# Patient Record
Sex: MALE | Race: WHITE | NOT HISPANIC OR LATINO | Employment: FULL TIME | URBAN - METROPOLITAN AREA
[De-identification: names, ages, dates, MRNs, and addresses within clinical notes are randomized per-mention and may not be internally consistent; named-entity substitution may affect disease eponyms.]

---

## 2022-02-28 ENCOUNTER — HOSPITAL ENCOUNTER (OUTPATIENT)
Facility: CLINIC | Age: 32
Setting detail: OBSERVATION
Discharge: HOME | End: 2022-03-01
Attending: EMERGENCY MEDICINE | Admitting: HOSPITALIST
Payer: COMMERCIAL

## 2022-02-28 ENCOUNTER — APPOINTMENT (OUTPATIENT)
Dept: CT IMAGING | Facility: CLINIC | Age: 32
End: 2022-02-28
Attending: EMERGENCY MEDICINE
Payer: COMMERCIAL

## 2022-02-28 DIAGNOSIS — K51.919 ULCERATIVE COLITIS WITH COMPLICATION, UNSPECIFIED LOCATION (H): ICD-10-CM

## 2022-02-28 DIAGNOSIS — R50.9 ACUTE FEBRILE ILLNESS: ICD-10-CM

## 2022-02-28 LAB
ALBUMIN SERPL-MCNC: 3.9 G/DL (ref 3.4–5)
ALBUMIN UR-MCNC: NEGATIVE MG/DL
ALP SERPL-CCNC: 52 U/L (ref 40–150)
ALT SERPL W P-5'-P-CCNC: 20 U/L (ref 0–70)
ANION GAP SERPL CALCULATED.3IONS-SCNC: 4 MMOL/L (ref 3–14)
APPEARANCE UR: CLEAR
AST SERPL W P-5'-P-CCNC: 15 U/L (ref 0–45)
BASOPHILS # BLD AUTO: 0 10E3/UL (ref 0–0.2)
BASOPHILS NFR BLD AUTO: 0 %
BILIRUB SERPL-MCNC: 1.2 MG/DL (ref 0.2–1.3)
BILIRUB UR QL STRIP: NEGATIVE
BUN SERPL-MCNC: 11 MG/DL (ref 7–30)
CALCIUM SERPL-MCNC: 9.2 MG/DL (ref 8.5–10.1)
CHLORIDE BLD-SCNC: 107 MMOL/L (ref 94–109)
CO2 SERPL-SCNC: 27 MMOL/L (ref 20–32)
COLOR UR AUTO: ABNORMAL
CREAT SERPL-MCNC: 0.86 MG/DL (ref 0.66–1.25)
EOSINOPHIL # BLD AUTO: 0 10E3/UL (ref 0–0.7)
EOSINOPHIL NFR BLD AUTO: 0 %
ERYTHROCYTE [DISTWIDTH] IN BLOOD BY AUTOMATED COUNT: 12 % (ref 10–15)
GFR SERPL CREATININE-BSD FRML MDRD: >90 ML/MIN/1.73M2
GLUCOSE BLD-MCNC: 119 MG/DL (ref 70–99)
GLUCOSE UR STRIP-MCNC: NEGATIVE MG/DL
HCT VFR BLD AUTO: 48 % (ref 40–53)
HGB BLD-MCNC: 16 G/DL (ref 13.3–17.7)
HGB UR QL STRIP: NEGATIVE
IMM GRANULOCYTES # BLD: 0 10E3/UL
IMM GRANULOCYTES NFR BLD: 0 %
KETONES UR STRIP-MCNC: NEGATIVE MG/DL
LACTATE SERPL-SCNC: 1.1 MMOL/L (ref 0.7–2)
LEUKOCYTE ESTERASE UR QL STRIP: NEGATIVE
LYMPHOCYTES # BLD AUTO: 0.8 10E3/UL (ref 0.8–5.3)
LYMPHOCYTES NFR BLD AUTO: 7 %
MCH RBC QN AUTO: 31.4 PG (ref 26.5–33)
MCHC RBC AUTO-ENTMCNC: 33.3 G/DL (ref 31.5–36.5)
MCV RBC AUTO: 94 FL (ref 78–100)
MONOCYTES # BLD AUTO: 0.8 10E3/UL (ref 0–1.3)
MONOCYTES NFR BLD AUTO: 7 %
NEUTROPHILS # BLD AUTO: 10.4 10E3/UL (ref 1.6–8.3)
NEUTROPHILS NFR BLD AUTO: 86 %
NITRATE UR QL: NEGATIVE
NRBC # BLD AUTO: 0 10E3/UL
NRBC BLD AUTO-RTO: 0 /100
PH UR STRIP: 8.5 [PH] (ref 5–7)
PLATELET # BLD AUTO: 205 10E3/UL (ref 150–450)
POTASSIUM BLD-SCNC: 3.7 MMOL/L (ref 3.4–5.3)
PROT SERPL-MCNC: 6.8 G/DL (ref 6.8–8.8)
RBC # BLD AUTO: 5.1 10E6/UL (ref 4.4–5.9)
RBC URINE: <1 /HPF
SARS-COV-2 RNA RESP QL NAA+PROBE: NEGATIVE
SODIUM SERPL-SCNC: 138 MMOL/L (ref 133–144)
SP GR UR STRIP: 1.02 (ref 1–1.03)
UROBILINOGEN UR STRIP-MCNC: NORMAL MG/DL
WBC # BLD AUTO: 12.1 10E3/UL (ref 4–11)
WBC URINE: 0 /HPF

## 2022-02-28 PROCEDURE — 99220 PR INITIAL OBSERVATION CARE,LEVEL III: CPT | Performed by: PHYSICIAN ASSISTANT

## 2022-02-28 PROCEDURE — 96361 HYDRATE IV INFUSION ADD-ON: CPT

## 2022-02-28 PROCEDURE — 258N000003 HC RX IP 258 OP 636: Performed by: EMERGENCY MEDICINE

## 2022-02-28 PROCEDURE — 83605 ASSAY OF LACTIC ACID: CPT | Performed by: EMERGENCY MEDICINE

## 2022-02-28 PROCEDURE — 250N000011 HC RX IP 250 OP 636: Performed by: EMERGENCY MEDICINE

## 2022-02-28 PROCEDURE — 80053 COMPREHEN METABOLIC PANEL: CPT | Performed by: EMERGENCY MEDICINE

## 2022-02-28 PROCEDURE — C9803 HOPD COVID-19 SPEC COLLECT: HCPCS

## 2022-02-28 PROCEDURE — 81001 URINALYSIS AUTO W/SCOPE: CPT | Performed by: EMERGENCY MEDICINE

## 2022-02-28 PROCEDURE — G0378 HOSPITAL OBSERVATION PER HR: HCPCS

## 2022-02-28 PROCEDURE — 250N000009 HC RX 250: Performed by: EMERGENCY MEDICINE

## 2022-02-28 PROCEDURE — 258N000003 HC RX IP 258 OP 636: Performed by: PHYSICIAN ASSISTANT

## 2022-02-28 PROCEDURE — 74177 CT ABD & PELVIS W/CONTRAST: CPT

## 2022-02-28 PROCEDURE — 36415 COLL VENOUS BLD VENIPUNCTURE: CPT | Performed by: EMERGENCY MEDICINE

## 2022-02-28 PROCEDURE — 99285 EMERGENCY DEPT VISIT HI MDM: CPT | Mod: 25

## 2022-02-28 PROCEDURE — 250N000013 HC RX MED GY IP 250 OP 250 PS 637: Performed by: PHYSICIAN ASSISTANT

## 2022-02-28 PROCEDURE — 87635 SARS-COV-2 COVID-19 AMP PRB: CPT | Performed by: EMERGENCY MEDICINE

## 2022-02-28 PROCEDURE — 96374 THER/PROPH/DIAG INJ IV PUSH: CPT | Mod: 59

## 2022-02-28 PROCEDURE — 87040 BLOOD CULTURE FOR BACTERIA: CPT | Performed by: EMERGENCY MEDICINE

## 2022-02-28 PROCEDURE — 85025 COMPLETE CBC W/AUTO DIFF WBC: CPT | Performed by: EMERGENCY MEDICINE

## 2022-02-28 RX ORDER — PREDNISONE 10 MG/1
10 TABLET ORAL DAILY
Status: DISCONTINUED | OUTPATIENT
Start: 2022-03-01 | End: 2022-03-01 | Stop reason: HOSPADM

## 2022-02-28 RX ORDER — LIDOCAINE 40 MG/G
CREAM TOPICAL
Status: DISCONTINUED | OUTPATIENT
Start: 2022-02-28 | End: 2022-03-01 | Stop reason: HOSPADM

## 2022-02-28 RX ORDER — SODIUM CHLORIDE, SODIUM LACTATE, POTASSIUM CHLORIDE, CALCIUM CHLORIDE 600; 310; 30; 20 MG/100ML; MG/100ML; MG/100ML; MG/100ML
INJECTION, SOLUTION INTRAVENOUS CONTINUOUS
Status: DISCONTINUED | OUTPATIENT
Start: 2022-02-28 | End: 2022-03-01 | Stop reason: HOSPADM

## 2022-02-28 RX ORDER — MESALAMINE 800 MG/1
3 TABLET, DELAYED RELEASE ORAL 2 TIMES DAILY
COMMUNITY
Start: 2022-01-25

## 2022-02-28 RX ORDER — PROCHLORPERAZINE 25 MG
25 SUPPOSITORY, RECTAL RECTAL EVERY 12 HOURS PRN
Status: DISCONTINUED | OUTPATIENT
Start: 2022-02-28 | End: 2022-03-01 | Stop reason: HOSPADM

## 2022-02-28 RX ORDER — MIDODRINE HYDROCHLORIDE 5 MG/1
5 TABLET ORAL DAILY
COMMUNITY
Start: 2020-09-03

## 2022-02-28 RX ORDER — IOPAMIDOL 755 MG/ML
500 INJECTION, SOLUTION INTRAVASCULAR ONCE
Status: COMPLETED | OUTPATIENT
Start: 2022-02-28 | End: 2022-02-28

## 2022-02-28 RX ORDER — ONDANSETRON 4 MG/1
4 TABLET, ORALLY DISINTEGRATING ORAL EVERY 6 HOURS PRN
Status: DISCONTINUED | OUTPATIENT
Start: 2022-02-28 | End: 2022-03-01 | Stop reason: HOSPADM

## 2022-02-28 RX ORDER — FERROUS SULFATE 325(65) MG
325 TABLET ORAL
COMMUNITY

## 2022-02-28 RX ORDER — PREDNISONE 10 MG/1
TABLET ORAL
COMMUNITY
Start: 2022-01-25

## 2022-02-28 RX ORDER — SERTRALINE HYDROCHLORIDE 100 MG/1
100 TABLET, FILM COATED ORAL DAILY
COMMUNITY
Start: 2020-09-03

## 2022-02-28 RX ORDER — ONDANSETRON 2 MG/ML
4 INJECTION INTRAMUSCULAR; INTRAVENOUS EVERY 6 HOURS PRN
Status: DISCONTINUED | OUTPATIENT
Start: 2022-02-28 | End: 2022-03-01 | Stop reason: HOSPADM

## 2022-02-28 RX ORDER — MESALAMINE 800 MG/1
2400 TABLET, DELAYED RELEASE ORAL 2 TIMES DAILY
Status: DISCONTINUED | OUTPATIENT
Start: 2022-02-28 | End: 2022-03-01 | Stop reason: HOSPADM

## 2022-02-28 RX ORDER — ACETAMINOPHEN 325 MG/1
650 TABLET ORAL EVERY 6 HOURS PRN
Status: DISCONTINUED | OUTPATIENT
Start: 2022-02-28 | End: 2022-03-01 | Stop reason: HOSPADM

## 2022-02-28 RX ORDER — FERROUS SULFATE 325(65) MG
325 TABLET ORAL
Status: DISCONTINUED | OUTPATIENT
Start: 2022-03-01 | End: 2022-03-01 | Stop reason: HOSPADM

## 2022-02-28 RX ORDER — KETOROLAC TROMETHAMINE 30 MG/ML
30 INJECTION, SOLUTION INTRAMUSCULAR; INTRAVENOUS EVERY 6 HOURS PRN
Status: DISCONTINUED | OUTPATIENT
Start: 2022-02-28 | End: 2022-03-01 | Stop reason: HOSPADM

## 2022-02-28 RX ORDER — SERTRALINE HYDROCHLORIDE 100 MG/1
100 TABLET, FILM COATED ORAL DAILY
Status: DISCONTINUED | OUTPATIENT
Start: 2022-03-01 | End: 2022-03-01 | Stop reason: HOSPADM

## 2022-02-28 RX ORDER — VITAMIN B COMPLEX
1 TABLET ORAL DAILY
COMMUNITY

## 2022-02-28 RX ORDER — ACETAMINOPHEN 650 MG/1
650 SUPPOSITORY RECTAL EVERY 6 HOURS PRN
Status: DISCONTINUED | OUTPATIENT
Start: 2022-02-28 | End: 2022-03-01 | Stop reason: HOSPADM

## 2022-02-28 RX ORDER — CHLORAL HYDRATE 500 MG
1 CAPSULE ORAL DAILY
COMMUNITY

## 2022-02-28 RX ORDER — PROCHLORPERAZINE MALEATE 5 MG
10 TABLET ORAL EVERY 6 HOURS PRN
Status: DISCONTINUED | OUTPATIENT
Start: 2022-02-28 | End: 2022-03-01 | Stop reason: HOSPADM

## 2022-02-28 RX ORDER — MIDODRINE HYDROCHLORIDE 5 MG/1
5 TABLET ORAL DAILY
Status: DISCONTINUED | OUTPATIENT
Start: 2022-03-01 | End: 2022-03-01 | Stop reason: HOSPADM

## 2022-02-28 RX ORDER — KETOROLAC TROMETHAMINE 15 MG/ML
15 INJECTION, SOLUTION INTRAMUSCULAR; INTRAVENOUS ONCE
Status: COMPLETED | OUTPATIENT
Start: 2022-02-28 | End: 2022-02-28

## 2022-02-28 RX ADMIN — SODIUM CHLORIDE 65 ML: 9 INJECTION, SOLUTION INTRAVENOUS at 15:08

## 2022-02-28 RX ADMIN — MESALAMINE 2400 MG: 800 TABLET, DELAYED RELEASE ORAL at 20:08

## 2022-02-28 RX ADMIN — SODIUM CHLORIDE, POTASSIUM CHLORIDE, SODIUM LACTATE AND CALCIUM CHLORIDE: 600; 310; 30; 20 INJECTION, SOLUTION INTRAVENOUS at 20:01

## 2022-02-28 RX ADMIN — IOPAMIDOL 95 ML: 755 INJECTION, SOLUTION INTRAVENOUS at 15:08

## 2022-02-28 RX ADMIN — KETOROLAC TROMETHAMINE 15 MG: 15 INJECTION, SOLUTION INTRAMUSCULAR; INTRAVENOUS at 14:18

## 2022-02-28 RX ADMIN — SODIUM CHLORIDE, POTASSIUM CHLORIDE, SODIUM LACTATE AND CALCIUM CHLORIDE 1000 ML: 600; 310; 30; 20 INJECTION, SOLUTION INTRAVENOUS at 14:08

## 2022-02-28 ASSESSMENT — ENCOUNTER SYMPTOMS
FREQUENCY: 0
BLOOD IN STOOL: 1
ABDOMINAL PAIN: 1
DIARRHEA: 1
RECTAL PAIN: 1
HEMATURIA: 0
ABDOMINAL DISTENTION: 1
FEVER: 1
DYSURIA: 0
NAUSEA: 1
CHILLS: 1

## 2022-02-28 NOTE — ED PROVIDER NOTES
History   Chief Complaint:  Abdominal Pain and Fever     The history is provided by the patient.      Rick Tarango is a 31 year old male with history of ulcerative colitis who presents with abdominal pain and fever. Patient has been having a flare of his ulcerative colitis since July. He presents today due to a fever of 100.8F this morning. He notes some abdominal pain and distention throughout this flare. He noticed blood in his stool this morning as well. He is having two to four mucous-like bowel movements per day. He reports feeling nauseous and having the chills as well. He has some discomfort around his anus but he does not think the skin is broken down. He notes he had some right rectal pain yesterday after having a bowel movement. He is currently taking Remicade and is at the end of a prednisone taper. He started on 40 mg of prednisone and has been decreasing each week. He went from 15 mg to 10 mg six days ago and is planning to continue to taper down. His last Remicade infusion was two weeks ago. His last flare was winter of 2020. This current flare is the longest flare he has had. No urinary concerns. He was initially diagnosed with ulcerative colitis in 2017. He did have an infection in his colon when first diagnosed. He has had C.diff in the past as well. He follows with Dr. Hawley at [facility].      Review of Systems   Constitutional: Positive for chills and fever.   Gastrointestinal: Positive for abdominal distention, abdominal pain, blood in stool, diarrhea, nausea and rectal pain.   Genitourinary: Negative for dysuria, frequency and hematuria.   All other systems reviewed and are negative.     Allergies:  The patient has no known allergies.     Medications:  Remicade  Prednisone     Past Medical History:     Pityriasis lichenoides  SVT   Ulcerative colitis   Varicella  Anxiety state   Atrial fibrillation     Past Surgical History:    Cardiac ablation  Upper endoscopy   Colonoscopy     Family  History:    Daughter: heart disease     Social History:  Presents to ED alone    Physical Exam     Patient Vitals for the past 24 hrs:   BP Temp Temp src Pulse Resp SpO2 Weight   02/28/22 1331 124/69 100  F (37.8  C) Oral 113 18 100 % 85.7 kg (189 lb)       Physical Exam    HENT: mmm  Eyes: periorbital tissue and sclera normal  Neck: supple  CV: ppi, regular   Resp: speaking in full sentences without any resp distress  Abd: abdomen is soft without significant tenderness, masses, organomegaly or guarding, rectum: No fissure, hemorrhoid, fluctuance, significant tenderness palpation  Ext: peripheral edema present:  No  Skin: warm dry well perfused  Neuro: Alert, no gross motor or sensory deficits,  gait stable        Emergency Department Course     Imaging:  Abd/pelvis CT,  IV  contrast only TRAUMA / AAA   Final Result   IMPRESSION:    No acute abnormality in the abdomen or pelvis. No cause for abdominal   pain is identified.      WILLIAM GREGG MD            SYSTEM ID:  [id number]      Report per radiology    Laboratory:  Labs Ordered and Resulted from Time of ED Arrival to Time of ED Departure   COMPREHENSIVE METABOLIC PANEL - Abnormal       Result Value    Sodium 138      Potassium 3.7      Chloride 107      Carbon Dioxide (CO2) 27      Anion Gap 4      Urea Nitrogen 11      Creatinine 0.86      Calcium 9.2      Glucose 119 (*)     Alkaline Phosphatase 52      AST 15      ALT 20      Protein Total 6.8      Albumin 3.9      Bilirubin Total 1.2      GFR Estimate >90     ROUTINE UA WITH MICROSCOPIC REFLEX TO CULTURE - Abnormal    Color Urine Light Yellow      Appearance Urine Clear      Glucose Urine Negative      Bilirubin Urine Negative      Ketones Urine Negative      Specific Gravity Urine 1.019      Blood Urine Negative      pH Urine 8.5 (*)     Protein Albumin Urine Negative      Urobilinogen Urine Normal      Nitrite Urine Negative      Leukocyte Esterase Urine Negative      RBC Urine <1      WBC Urine 0     CBC  WITH PLATELETS AND DIFFERENTIAL - Abnormal    WBC Count 12.1 (*)     RBC Count 5.10      Hemoglobin 16.0      Hematocrit 48.0      MCV 94      MCH 31.4      MCHC 33.3      RDW 12.0      Platelet Count 205      % Neutrophils 86      % Lymphocytes 7      % Monocytes 7      % Eosinophils 0      % Basophils 0      % Immature Granulocytes 0      NRBCs per 100 WBC 0      Absolute Neutrophils 10.4 (*)     Absolute Lymphocytes 0.8      Absolute Monocytes 0.8      Absolute Eosinophils 0.0      Absolute Basophils 0.0      Absolute Immature Granulocytes 0.0      Absolute NRBCs 0.0     LACTIC ACID WHOLE BLOOD - Normal    Lactic Acid 1.1     ENTERIC BACTERIA AND VIRUS PANEL BY YOBANI STOOL   CLOSTRIDIUM DIFFICILE TOXIN B   BLOOD CULTURE   BLOOD CULTURE      Emergency Department Course:     Reviewed:  I reviewed nursing notes, vitals, past medical history and Care Everywhere    Assessments:  1347 I obtained history and examined the patient as noted above.    I rechecked the patient and explained findings.     Consults:  1453 I spoke with Dr. Ryan from GI regarding patient's presentation, findings, and plan of care.     1600 I spoke with GI again regarding the patient's presentation, findings, and plan of care.     Interventions:  1408 Lactated ringers 1L IV   1418 Toradol 15 mg IV     Disposition:  The patient was admitted to the hospital under the care of Dr. Bradshaw .     Impression & Plan         Medical Decision Makin-year-old on Remicade and prednisone (long slow taper) currently 10 mg presenting with lower abdominal pain, fever, small number of blood per rectum in the setting of known ulcerative colitis with stool frequency recently of 2-4 stools per day.  Temperature noted to be 100 here with associated mild tachycardia.  No significant leukocytosis or leukopenia.  Lactate normal.  Abdominal exam is reassuring.  No signs on exam of perirectal abscess or fistula.  CT without significant acute finding be it bowel edema or  secondary source of fever such as appendicitis.    Given Remicade slowly tapering prednisone and a mixed picture of bright red blood and mucus per rectum but no recent increase in stool frequency with decrease in steroid dose as well as history of C. difficile colitis and not wanting to give empiric antibiotics given overall stability and increased risk of C. difficile will admit to observation and follow the stool studies to see if they can give us a causative etiology.  Patient comfortable and agreeable with plan nose stool studies will help dictate antibiotics versus not as well as to increase steroids or not.    Diagnosis:    ICD-10-CM    1. Ulcerative colitis with complication, unspecified location (H)  K51.919    2. Acute febrile illness  R50.9        Discharge Medications:  New Prescriptions    No medications on file       Scribe Disclosure:  Juan PEREZ, am serving as a scribe at 1:45 PM on 2/28/2022 to document services personally performed by Ceferino Nina MD based on my observations and the provider's statements to me.            Ceferino Nina MD  02/28/22 4975

## 2022-02-28 NOTE — ED TRIAGE NOTES
A&O x4, ABCs intact. Pt presents with concern for abdominal pain and fever. Pt states that he has been having an ulcerative colitis flair and this morning he developed a fever of 100.8. pt taking remicade and prednisone.

## 2022-02-28 NOTE — H&P
History and Physical     Rick Tarango MRN# [id number]   YOB: 1990 Age: 31 year old      Date of Admission:  2/28/2022    Primary care provider: [facility]          Assessment and Plan:   Rick Tarango is a 31 year old male with a PMH significant for ulcerative colitis, previous C. difficile infection, anemia, anxiety and paroxysmal ventricular tachycardia who presents with bright red blood per rectum and fever in the setting of ulcerative colitis flare.     Patient was discussed with Dr. Nina, who was provider in ED. Chart review of ED work up was reviewed as well as chart review of Care Everywhere, previous visits and admissions.     #Bright red blood per rectum with fever  #History of ulcerative colitis  -Development of bright red blood per rectum on 2/28 with fever up to 100.8.  Otherwise feels well except for lack of appetite  -Abdominal pain at baseline and stools have been more formed  -Lab work remarkable for mild elevation of WBC at 12.1 and normal lactic acid  -CT abdomen unremarkable  -History of C. Difficile  -Stool sample for enteric panel and C. difficile need to be collected  -Blood cultures taken  -Dr. Ryan from [facility] evaluated patient in the emergency room requesting overnight observation  -No antibiotics recommended at this time  -LR at 100 mL/h  -Regular diet    #History of ulcerative colitis  -Follows with [facility]  -Current flare has been since July  -Receives regular Remicade and is on prednisone taper currently at 10 mg  -Dr. Ryan does not recommend any increase of steroids at this time  -GI consult  -Continue mesalamine and prednisone 10 mg    #History of paroxysmal ventricular tachycardia  -Continue midodrine    #Anxiety  -Continue sertraline            Social: No concerns  Code: Discussed with patient and they have chosen full code  VTE prophylaxis: PCDs  Disposition: Observation                    Chief Complaint:   Bright red blood per rectum  and fever         History of Present Illness:   Rick Tarango is a 31 year old male who presents with bright red blood per rectum and fever.  He states that he has been struggling since July with an ulcerative colitis flare and is currently receiving Remicade with a steroid taper.  He recently reduced his prednisone from 15 mg daily to 10 mg daily.  He noted bright red blood per rectum this morning along with fever up to 100.  He otherwise feels fine except for lack of appetite.  He did eat something this morning without any worsening of his abdominal pain.  He denies urinary symptoms, shortness of breath, cough and abdominal cramping.  His stools have been more formed than usual.  He has not traveled recently and has not had any sick contacts.  He does not drink alcohol regularly or smoke cigarettes.             Past Medical History:   Ulcerative colitis  Paroxysmal ventricular tachycardia  Anxiety  Anemia   C. difficile infection            Past Surgical History:   No past surgical history            Social History:     Social History     Socioeconomic History     Marital status: Single     Spouse name: Not on file     Number of children: Not on file     Years of education: Not on file     Highest education level: Not on file   Occupational History     Not on file   Tobacco Use     Smoking status: Not on file     Smokeless tobacco: Not on file   Substance and Sexual Activity     Alcohol use: Not on file     Drug use: Not on file     Sexual activity: Not on file   Other Topics Concern     Not on file   Social History Narrative     Not on file     Social Determinants of Health     Financial Resource Strain: Not on file   Food Insecurity: Not on file   Transportation Needs: Not on file   Physical Activity: Not on file   Stress: Not on file   Social Connections: Not on file   Intimate Partner Violence: Not on file   Housing Stability: Not on file               Family History:   Family history reviewed and is non  contributory         Allergies:    No Known Allergies            Medications:     Prior to Admission medications    Not on File              Review of Systems:   A Comprehensive greater than 10 system review of systems was carried out.  Pertinent positives and negatives are noted above.  Otherwise negative for contributory information.            Physical Exam:   Blood pressure 124/69, pulse 113, temperature 100  F (37.8  C), temperature source Oral, resp. rate 18, weight 85.7 kg (189 lb), SpO2 100 %.  Exam:  GENERAL:  Comfortable.  PSYCH: pleasant, oriented, No acute distress.  HEENT:  PERRLA. Normal conjunctiva, normal hearing, nasal mucosa and Oropharynx are normal.  NECK:  Supple, no neck vein distention, adenopathy or bruits, normal thyroid.  HEART:  Normal S1, S2 with no murmur, no pericardial rub, gallops or S3 or S4.  LUNGS:  Clear to auscultation, normal Respiratory effort. No wheezing, rales or ronchi.  ABDOMEN:  Soft, no hepatosplenomegaly, normal bowel sounds. Non-tender, non distended.   EXTREMITIES:  No pedal edema, +2 pulses bilateral and equal.  SKIN:  Dry to touch, No rash, wound or ulcerations.  NEUROLOGIC:  CN 2-12 grossly intact,  sensation is intact with no focal deficits.               Data:     Recent Labs   Lab 02/28/22  1408   WBC 12.1*   HGB 16.0   HCT 48.0   MCV 94        Recent Labs   Lab 02/28/22  1408      POTASSIUM 3.7   CHLORIDE 107   CO2 27   ANIONGAP 4   *   BUN 11   CR 0.86   GFRESTIMATED >90   DAVON 9.2   PROTTOTAL 6.8   ALBUMIN 3.9   BILITOTAL 1.2   ALKPHOS 52   AST 15   ALT 20     Recent Labs   Lab 02/28/22  1408   LACT 1.1         Recent Results (from the past 24 hour(s))   Abd/pelvis CT,  IV  contrast only TRAUMA / AAA    Narrative    CT ABDOMEN PELVIS WITH CONTRAST 2/28/2022 3:14 PM    CLINICAL HISTORY: Fever. Abdominal pain. History of ulcerative  colitis.    TECHNIQUE: CT scan of the abdomen and pelvis was performed following  injection of IV contrast.  Multiplanar reformats were obtained. Dose  reduction techniques were used.  CONTRAST: 95mL Isovue-370  COMPARISON: None.    FINDINGS:   LOWER CHEST: The visualized lung bases are clear.    HEPATOBILIARY: Unremarkable. No hepatic masses are seen.    PANCREAS: Normal.    SPLEEN: Normal.    ADRENAL GLANDS: Normal.    KIDNEYS/BLADDER: Unremarkable. No hydronephrosis.    BOWEL: No bowel obstruction. No convincing evidence for colitis or  diverticulitis. Unremarkable appendix.    PELVIC ORGANS: Unremarkable.    LYMPH NODES: No enlarged lymph nodes are identified in the abdomen or  pelvis.    VASCULATURE: Unremarkable.    ADDITIONAL FINDINGS: None.    MUSCULOSKELETAL: Unremarkable.      Impression    IMPRESSION:   No acute abnormality in the abdomen or pelvis. No cause for abdominal  pain is identified.    WILLIAM GREGG MD         SYSTEM ID:  [id number]         Lucia Alvarez PA-C

## 2022-02-28 NOTE — PHARMACY-ADMISSION MEDICATION HISTORY
Admission medication history interview status for this patient is complete. See [facility] admission navigator for allergy information, prior to admission medications and immunization status.     Medication history interview done, indicate source(s): Patient  Medication history resources (including written lists, pill bottles, clinic record):SureScripts, Care Everywhere  Pharmacy: [id number] [place], [place] - [address]    Changes made to PTA medication list:  Added: all meds  Changed: none  Reported as Not Taking: none  Removed: none    Actions taken by pharmacist (provider contacted, etc):paged provider     Additional medication history information:pt reported having first dose of Remicade on 1/31/22 and the second dose on 2/14/22. Mesalamine was prescribed 1600 mg BID but pt is taking 2400 mg BID.    Medication reconciliation/reorder completed by provider prior to medication history?  N    Prior to Admission medications    Medication Sig Last Dose Taking? Auth Provider   ferrous sulfate (FEROSUL) 325 (65 Fe) MG tablet Take 325 mg by mouth daily (with breakfast) 2/28/2022 at Unknown time Yes Unknown, Entered By History   fish oil-omega-3 fatty acids (OMEGA-3 FISH OIL) 1000 MG capsule Take 1 capsule by mouth daily 2/28/2022 at Unknown time Yes Unknown, Entered By History   mesalamine (ASACOL HD) 800 MG EC tablet Take 3 tablets by mouth 2 times daily 2/28/2022 at Unknown time Yes Unknown, Entered By History   midodrine (PROAMATINE) 5 MG tablet Take 5 mg by mouth daily 2/28/2022 at Unknown time Yes Unknown, Entered By History   Multiple Vitamin (MULTIVITAMIN ADULT PO) Take 1 tablet by mouth daily 2/28/2022 at Unknown time Yes Unknown, Entered By History   predniSONE (DELTASONE) 10 MG tablet TAKE 4 TABS ONCE DAILY X1 WK, 3 TABS DAILY X1 WK,2 TABS DAILY X1 WK,THEN DECREASE BY 1/2 TAB EACH WK 2/28/2022 at 10 mg today; last started 10 mg on Wednesday 2/23 Yes Unknown, Entered By History   sertraline  (ZOLOFT) 100 MG tablet Take 100 mg by mouth daily 2/28/2022 at AM Yes Unknown, Entered By History   Vitamin D3 (CHOLECALCIFEROL) 25 mcg (1000 units) tablet Take 1 tablet by mouth daily 2/28/2022 at Unknown time Yes Unknown, Entered By History

## 2022-02-28 NOTE — ED NOTES
[facility]  ED Nurse Handoff Report    Rick Tarango is a 31 year old male   ED Chief complaint: Abdominal Pain and Fever  . ED Diagnosis:   Final diagnoses:   Ulcerative colitis with complication, unspecified location (H)   Acute febrile illness     Allergies: No Known Allergies    Code Status: Full Code  Activity level - Baseline/Home:  Independent. Activity Level - Current:   Stand by Assist. Lift room needed: No. Bariatric: No   Needed: No   Isolation: No. Infection: Not Applicable  C-Diff Pending.     Vital Signs:   Vitals:    02/28/22 1331 02/28/22 1708 02/28/22 1712   BP: 124/69 123/72    Pulse: 113 84    Resp: 18     Temp: 100  F (37.8  C)     TempSrc: Oral     SpO2: 100% 100% 98%   Weight: 85.7 kg (189 lb)         Cardiac Rhythm:  ,      Pain level:    Patient confused: No. Patient Falls Risk: No.   Elimination Status: Has voided   Patient Report - Initial Complaint: abdominal pain, fever, ulcerative colitis flare. Focused Assessment: 31 year old male with history of ulcerative colitis who presents with abdominal pain and fever. Patient has been having a flare of his ulcerative colitis since July. He presents today due to a fever of 100.8F this morning. He notes some abdominal pain and distention throughout this flare. He noticed blood in his stool this morning as well. He is having two to four mucous-like bowel movements per day. He reports feeling nauseous and having the chills as well. He has some discomfort around his anus but he does not think the skin is broken down. He notes he had some right rectal pain yesterday after having a bowel movement. He is currently taking Remicade and is at the end of a prednisone taper. He started on 40 mg of prednisone and has been decreasing each week. He went from 15 mg to 10 mg six days ago and is planning to continue to taper down. His last Remicade infusion was two weeks ago. His last flare was winter of 2020. This current flare is  the longest flare he has had. No urinary concerns. He was initially diagnosed with ulcerative colitis in 2017. He did have an infection in his colon when first diagnosed. He has had C.diff in the past as well. He follows with Dr. Hawley at [facility].        Tests Performed: imaging, labs. Abnormal Results:   Abnormal Labs Resulted from Time of ED Arrival to Time of ED Departure   COMPREHENSIVE METABOLIC PANEL - Abnormal       Result Value    Sodium 138      Potassium 3.7      Chloride 107      Carbon Dioxide (CO2) 27      Anion Gap 4      Urea Nitrogen 11      Creatinine 0.86      Calcium 9.2      Glucose 119 (*)     Alkaline Phosphatase 52      AST 15      ALT 20      Protein Total 6.8      Albumin 3.9      Bilirubin Total 1.2      GFR Estimate >90     ROUTINE UA WITH MICROSCOPIC REFLEX TO CULTURE - Abnormal    Color Urine Light Yellow      Appearance Urine Clear      Glucose Urine Negative      Bilirubin Urine Negative      Ketones Urine Negative      Specific Gravity Urine 1.019      Blood Urine Negative      pH Urine 8.5 (*)     Protein Albumin Urine Negative      Urobilinogen Urine Normal      Nitrite Urine Negative      Leukocyte Esterase Urine Negative      RBC Urine <1      WBC Urine 0     CBC WITH PLATELETS AND DIFFERENTIAL - Abnormal    WBC Count 12.1 (*)     RBC Count 5.10      Hemoglobin 16.0      Hematocrit 48.0      MCV 94      MCH 31.4      MCHC 33.3      RDW 12.0      Platelet Count 205      % Neutrophils 86      % Lymphocytes 7      % Monocytes 7      % Eosinophils 0      % Basophils 0      % Immature Granulocytes 0      NRBCs per 100 WBC 0      Absolute Neutrophils 10.4 (*)     Absolute Lymphocytes 0.8      Absolute Monocytes 0.8      Absolute Eosinophils 0.0      Absolute Basophils 0.0      Absolute Immature Granulocytes 0.0      Absolute NRBCs 0.0          Treatments provided: see MAR  Family Comments: wife - involved and supportive  OBS brochure/video discussed/provided to patient:  Yes  ED  Medications:   Medications   lactated ringers BOLUS 1,000 mL (0 mLs Intravenous Stopped 2/28/22 1543)   ketorolac (TORADOL) injection 15 mg (15 mg Intravenous Given 2/28/22 1418)   CT Scan Flush (65 mLs Intravenous Given 2/28/22 1508)   iopamidol (ISOVUE-370) solution 500 mL (95 mLs Intravenous Given 2/28/22 1508)     Drips infusing:  No  For the majority of the shift, the patient's behavior Green. Interventions performed were NA.    Sepsis treatment initiated: No     Patient tested for COVID 19 prior to admission: YES - pending    ED Nurse Name/Phone Number: Norma Rodriguez RN,   5:23 PM    RECEIVING UNIT ED HANDOFF REVIEW    Above ED Nurse Handoff Report was reviewed: Yes  Reviewed by: Rosana Juarez RN on February 28, 2022 at 6:31 PM

## 2022-03-01 VITALS
HEART RATE: 84 BPM | DIASTOLIC BLOOD PRESSURE: 72 MMHG | OXYGEN SATURATION: 97 % | SYSTOLIC BLOOD PRESSURE: 127 MMHG | WEIGHT: 189 LBS | BODY MASS INDEX: 27.06 KG/M2 | HEIGHT: 70 IN | TEMPERATURE: 98.3 F | RESPIRATION RATE: 16 BRPM

## 2022-03-01 LAB
ALBUMIN SERPL-MCNC: 3.1 G/DL (ref 3.4–5)
ALP SERPL-CCNC: 40 U/L (ref 40–150)
ALT SERPL W P-5'-P-CCNC: 16 U/L (ref 0–70)
ANION GAP SERPL CALCULATED.3IONS-SCNC: 2 MMOL/L (ref 3–14)
AST SERPL W P-5'-P-CCNC: 12 U/L (ref 0–45)
BILIRUB SERPL-MCNC: 1.4 MG/DL (ref 0.2–1.3)
BUN SERPL-MCNC: 10 MG/DL (ref 7–30)
C COLI+JEJUNI+LARI FUSA STL QL NAA+PROBE: NOT DETECTED
C DIFF TOX B STL QL: NEGATIVE
CALCIUM SERPL-MCNC: 8.6 MG/DL (ref 8.5–10.1)
CHLORIDE BLD-SCNC: 110 MMOL/L (ref 94–109)
CO2 SERPL-SCNC: 30 MMOL/L (ref 20–32)
CREAT SERPL-MCNC: 0.9 MG/DL (ref 0.66–1.25)
EC STX1 GENE STL QL NAA+PROBE: NOT DETECTED
EC STX2 GENE STL QL NAA+PROBE: NOT DETECTED
ERYTHROCYTE [DISTWIDTH] IN BLOOD BY AUTOMATED COUNT: 12.2 % (ref 10–15)
GFR SERPL CREATININE-BSD FRML MDRD: >90 ML/MIN/1.73M2
GLUCOSE BLD-MCNC: 97 MG/DL (ref 70–99)
HCT VFR BLD AUTO: 43.5 % (ref 40–53)
HGB BLD-MCNC: 14.3 G/DL (ref 13.3–17.7)
MCH RBC QN AUTO: 31.5 PG (ref 26.5–33)
MCHC RBC AUTO-ENTMCNC: 32.9 G/DL (ref 31.5–36.5)
MCV RBC AUTO: 96 FL (ref 78–100)
NOROV GI+II ORF1-ORF2 JNC STL QL NAA+PR: NOT DETECTED
PLATELET # BLD AUTO: 179 10E3/UL (ref 150–450)
POTASSIUM BLD-SCNC: 3.6 MMOL/L (ref 3.4–5.3)
PROT SERPL-MCNC: 5.6 G/DL (ref 6.8–8.8)
RBC # BLD AUTO: 4.54 10E6/UL (ref 4.4–5.9)
RVA NSP5 STL QL NAA+PROBE: NOT DETECTED
SALMONELLA SP RPOD STL QL NAA+PROBE: NOT DETECTED
SHIGELLA SP+EIEC IPAH STL QL NAA+PROBE: NOT DETECTED
SODIUM SERPL-SCNC: 142 MMOL/L (ref 133–144)
V CHOL+PARA RFBL+TRKH+TNAA STL QL NAA+PR: NOT DETECTED
WBC # BLD AUTO: 7.9 10E3/UL (ref 4–11)
Y ENTERO RECN STL QL NAA+PROBE: NOT DETECTED

## 2022-03-01 PROCEDURE — G0378 HOSPITAL OBSERVATION PER HR: HCPCS

## 2022-03-01 PROCEDURE — 87493 C DIFF AMPLIFIED PROBE: CPT | Performed by: PHYSICIAN ASSISTANT

## 2022-03-01 PROCEDURE — 80053 COMPREHEN METABOLIC PANEL: CPT | Performed by: PHYSICIAN ASSISTANT

## 2022-03-01 PROCEDURE — 82040 ASSAY OF SERUM ALBUMIN: CPT | Performed by: PHYSICIAN ASSISTANT

## 2022-03-01 PROCEDURE — 87506 IADNA-DNA/RNA PROBE TQ 6-11: CPT | Performed by: PHYSICIAN ASSISTANT

## 2022-03-01 PROCEDURE — 99217 PR OBSERVATION CARE DISCHARGE: CPT | Performed by: PHYSICIAN ASSISTANT

## 2022-03-01 PROCEDURE — 250N000012 HC RX MED GY IP 250 OP 636 PS 637: Performed by: PHYSICIAN ASSISTANT

## 2022-03-01 PROCEDURE — 85027 COMPLETE CBC AUTOMATED: CPT | Performed by: PHYSICIAN ASSISTANT

## 2022-03-01 PROCEDURE — 258N000003 HC RX IP 258 OP 636: Performed by: PHYSICIAN ASSISTANT

## 2022-03-01 PROCEDURE — 96361 HYDRATE IV INFUSION ADD-ON: CPT

## 2022-03-01 PROCEDURE — 36415 COLL VENOUS BLD VENIPUNCTURE: CPT | Performed by: PHYSICIAN ASSISTANT

## 2022-03-01 PROCEDURE — 250N000013 HC RX MED GY IP 250 OP 250 PS 637: Performed by: PHYSICIAN ASSISTANT

## 2022-03-01 RX ADMIN — PREDNISONE 10 MG: 10 TABLET ORAL at 08:17

## 2022-03-01 RX ADMIN — MESALAMINE 2400 MG: 800 TABLET, DELAYED RELEASE ORAL at 08:16

## 2022-03-01 RX ADMIN — SODIUM CHLORIDE, POTASSIUM CHLORIDE, SODIUM LACTATE AND CALCIUM CHLORIDE: 600; 310; 30; 20 INJECTION, SOLUTION INTRAVENOUS at 05:09

## 2022-03-01 RX ADMIN — SERTRALINE HYDROCHLORIDE 100 MG: 100 TABLET ORAL at 08:17

## 2022-03-01 RX ADMIN — FERROUS SULFATE TAB 325 MG (65 MG ELEMENTAL FE) 325 MG: 325 (65 FE) TAB at 08:17

## 2022-03-01 RX ADMIN — MIDODRINE HYDROCHLORIDE 5 MG: 5 TABLET ORAL at 08:17

## 2022-03-01 NOTE — PLAN OF CARE
"PRIMARY DIAGNOSIS: Ulcerative colitis with complication, Acute febrile illness.  OUTPATIENT/OBSERVATION GOALS TO BE MET BEFORE DISCHARGE:  1. ADLs back to baseline: No    2. Activity and level of assistance: Ambulating independently.    3. Pain status: Improved-controlled with oral pain medications.    4. Return to near baseline physical activity: No     Discharge Planner Nurse   Safe discharge environment identified: Yes  Barriers to discharge: Yes       Entered by: Cal Hunt 03/01/2022 12:35 AM    /61 (BP Location: Right arm)   Pulse 94   Temp 97.8  F (36.6  C) (Oral)   Resp 18   Ht 1.778 m (5' 10\")   Wt 85.7 kg (189 lb)   SpO2 96%   BMI 27.12 kg/m      Please review provider order for any additional goals.   Nurse to notify provider when observation goals have been met and patient is ready for discharge.                      "

## 2022-03-01 NOTE — PLAN OF CARE
"PRIMARY DIAGNOSIS: Ulcerative Colitis    OUTPATIENT/OBSERVATION GOALS TO BE MET BEFORE DISCHARGE  1. Orthostatic performed: N/A    2. Tolerating PO fluid and/or antibiotics (if applicable): Yes    3. Nausea/Vomiting/Diarrhea symptoms improved: Yes    4. Pain status: Pain free.    5. Return to near baseline physical activity: Yes    Discharge Planner Nurse   Safe discharge environment identified: Yes  Barriers to discharge: Yes       Entered by: Aby Coon 03/01/2022 1:55 PM     Please review provider order for any additional goals.   Nurse to notify provider when observation goals have been met and patient is ready for discharge.    Pt AO x4. VSS. LS Clear, BS active. Pt denied pain, had some abdominal discomfort. C. Diff negative. Up independently, tolerating regular diet. On enteric precautions. LR running.     /74 (BP Location: Left arm)   Pulse 78   Temp 98.7  F (37.1  C) (Oral)   Resp 16   Ht 1.778 m (5' 10\")   Wt 85.7 kg (189 lb)   SpO2 96%   BMI 27.12 kg/m                          "

## 2022-03-01 NOTE — PLAN OF CARE
"PRIMARY DIAGNOSIS:  Ulcerative colitis with complication, Acute febrile illness.  OUTPATIENT/OBSERVATION GOALS TO BE MET BEFORE DISCHARGE:  1. ADLs back to baseline: No    2. Activity and level of assistance: Ambulating independently.    3. Pain status: Improved-controlled with oral pain medications.    4. Return to near baseline physical activity: No     Discharge Planner Nurse   Safe discharge environment identified: Yes  Barriers to discharge: Yes       Entered by: Cal Hunt 03/01/2022 6:07 AM    /66 (BP Location: Left arm)   Pulse 78   Temp 97.8  F (36.6  C) (Oral)   Resp 16   Ht 1.778 m (5' 10\")   Wt 85.7 kg (189 lb)   SpO2 98%   BMI 27.12 kg/m    Pt is alert and oriented x4. Pt is independent in his room. No acute distress noted. Pt is on Enteric isolation for C-diff. Pt needs a stool sample for C-diff. Pt educated to call nursing staff when he has a BM. Pt had a BM but was too small to be collected. Pt stated pain 2/10 but refused pain medication when offered. Pt is on IVF LR @ 100 ml/hr. Pt is sleeping in bed and call light is within reach. Will continue to monitor and assess pt.   Please review provider order for any additional goals.   Nurse to notify provider when observation goals have been met and patient is ready for discharge.    "

## 2022-03-01 NOTE — PLAN OF CARE
"  PRIMARY DIAGNOSIS: Ulcerative colitis with complication, Acute febrile illness.  OUTPATIENT/OBSERVATION GOALS TO BE MET BEFORE DISCHARGE:  1. ADLs back to baseline: No    2. Activity and level of assistance: Ambulating independently.    3. Pain status: Improved-controlled with oral pain medications.    4. Return to near baseline physical activity: No     Discharge Planner Nurse   Safe discharge environment identified: Yes  Barriers to discharge: Yes       Entered by: Cal Hunt 02/28/2022 9:31 PM    /61 (BP Location: Right arm)   Pulse 94   Temp 97.8  F (36.6  C) (Oral)   Resp 18   Ht 1.778 m (5' 10\")   Wt 85.7 kg (189 lb)   SpO2 96%   BMI 27.12 kg/m    Pt is alert and oriented x4. Pt is independent in his room. No acute distress noted. Pt is on Enteric isolation for C-diff. Pt needs a stool sample for C-diff. Pt educated to call nursing staff when he has a BM. Pt wife at bedside. Pt stated pain 2/10 but refused pain medication when offered. Pt is on IVF LR @ 100 ml/hr. Pt is sleeping in bed and call light is within reach. Will continue to monitor and assess pt.   Please review provider order for any additional goals.   Nurse to notify provider when observation goals have been met and patient is ready for discharge.    "

## 2022-03-01 NOTE — CONSULTS
GASTROENTEROLOGY CONSULTATION      Rick Tarango  [address] [place] [address]  31 year old male     Admission Date/Time: 2/28/2022  Primary Care Provider: Clinic, [facility]  Referring / Attending Physician: Antonio DEVINE     We were asked to see the patient in consultation by Antonio DEVINE for evaluation of ulcerative colitis.        HPI:  Rick Tarango is a 31 year old male with medical history of ulcerative pancolitis diagnosed in 2017, history of C. difficile infection 2 years ago, who presents to the hospital with hematochezia and fever.    Patient reports that he has been struggling to get control of his  ulcerative colitis over the past few months.  He has been flaring and is currently on a prednisone taper.  He has received his second induction dose of Remicade and is scheduled for his third dose on March 14 through NG.  Patient has been on multiple prednisone tapers over the past few months.  A colonoscopy was attempted 1/25/2022.  However there was significant inflammation in the sigmoid and rectum so the procedure was stopped.  It was at this time that a biologic was initiated.  The patient is currently on 10 mg of prednisone.  He is to decrease to 5 mg tomorrow for 1 more week.    Since the patient's last infusion, he has seen improvement in his stooling urgency.  He seen a decrease in the frequency as well as more formed to his stool.  It was just yesterday that he saw the return of some red blood which previously had resolved.  This in combination with reported fever at home, brought into the hospital.  The emergency room his temperature was 100.8.  He denies any abdominal pain.  No nausea, vomiting, unintentional weight loss, or significant joint pain.  He does have a history of C. difficile.  He was treated with vancomycin about 2 years ago.  He was concerned he could have an infection possibly C. difficile again, so he came in for further evaluation.    On admission the  patient has a normal lactate.  White count was very mildly elevated at 12.1.  He is unaware of any sick contacts.  He denies any NSAID use.  He does not smoke.  A CT scan of his abdomen and pelvis did not reveal any active colitis.       PAST MEDICAL HISTORY:  Patient Active Problem List    Diagnosis Date Noted     Acute febrile illness 02/28/2022     Priority: Medium     Ulcerative colitis with complication, unspecified location (H) 02/28/2022     Priority: Medium          ROS: A comprehensive ten point review of systems was negative aside from those in mentioned in the HPI.       MEDICATIONS:   Prior to Admission medications    Medication Sig Start Date End Date Taking? Authorizing Provider   ferrous sulfate (FEROSUL) 325 (65 Fe) MG tablet Take 325 mg by mouth daily (with breakfast)   Yes Unknown, Entered By History   fish oil-omega-3 fatty acids (OMEGA-3 FISH OIL) 1000 MG capsule Take 1 capsule by mouth daily   Yes Unknown, Entered By History   mesalamine (ASACOL HD) 800 MG EC tablet Take 3 tablets by mouth 2 times daily 1/25/22  Yes Unknown, Entered By History   midodrine (PROAMATINE) 5 MG tablet Take 5 mg by mouth daily 9/3/20  Yes Unknown, Entered By History   Multiple Vitamin (MULTIVITAMIN ADULT PO) Take 1 tablet by mouth daily   Yes Unknown, Entered By History   predniSONE (DELTASONE) 10 MG tablet TAKE 4 TABS ONCE DAILY X1 WK, 3 TABS DAILY X1 WK,2 TABS DAILY X1 WK,THEN DECREASE BY 1/2 TAB EACH WK 1/25/22  Yes Unknown, Entered By History   sertraline (ZOLOFT) 100 MG tablet Take 100 mg by mouth daily 9/3/20  Yes Unknown, Entered By History   Vitamin D3 (CHOLECALCIFEROL) 25 mcg (1000 units) tablet Take 1 tablet by mouth daily   Yes Unknown, Entered By History        ALLERGIES: No Known Allergies     SOCIAL HISTORY:  Social History     Tobacco Use     Smoking status: Not on file     Smokeless tobacco: Not on file   Substance Use Topics     Alcohol use: Not on file     Drug use: Not on file        FAMILY  "HISTORY:  Non contributory      PHYSICAL EXAM:     /71 (BP Location: Left arm)   Pulse 69   Temp 98.2  F (36.8  C) (Oral)   Resp 18   Ht 1.778 m (5' 10\")   Wt 85.7 kg (189 lb)   SpO2 96%   BMI 27.12 kg/m       PHYSICAL EXAM:  GENERAL: No distress, resting comfortably  SKIN: no suspicious lesions, rashes, jaundice  HEAD: Normocephalic. Atraumatic.  NECK: Neck supple. No adenopathy.   EYES: No scleral icterus  RESPIRATORY: Good transmission. CTA bilaterally.   CARDIOVASCULAR: RRR, normal S1, S2,  No murmur appreciated  GASTROINTESTINAL: +BS, soft, non tender, non distended, no guarding/rebound  JOINT/EXTREMITIES:  no gross deformities noted, normal muscle tone  NEURO: CN 2-12 grossly intact, no focal deficits  PSYCH: Normal affect     ADDITIONAL COMMENTS:   I reviewed the patient's new clinical lab test results.     Recent Labs   Lab 03/01/22  0610 02/28/22  1408   WBC 7.9 12.1*   RBC 4.54 5.10   HGB 14.3 16.0   HCT 43.5 48.0   MCV 96 94   MCH 31.5 31.4   MCHC 32.9 33.3   RDW 12.2 12.0    205     Recent Labs   Lab Test 03/01/22  0610 02/28/22  1408   POTASSIUM 3.6 3.7   CHLORIDE 110* 107   CO2 30 27   BUN 10 11   ANIONGAP 2* 4     Recent Labs   Lab Test 03/01/22  0610 02/28/22  1408   ALBUMIN 3.1* 3.9   BILITOTAL 1.4* 1.2   ALT 16 20   AST 12 15   PROTEIN  --  Negative       IMAGING / ENDOSCOPY       Recent Results (from the past 24 hour(s))   Abd/pelvis CT,  IV  contrast only TRAUMA / AAA    Narrative    CT ABDOMEN PELVIS WITH CONTRAST 2/28/2022 3:14 PM    CLINICAL HISTORY: Fever. Abdominal pain. History of ulcerative  colitis.    TECHNIQUE: CT scan of the abdomen and pelvis was performed following  injection of IV contrast. Multiplanar reformats were obtained. Dose  reduction techniques were used.  CONTRAST: 95mL Isovue-370  COMPARISON: None.    FINDINGS:   LOWER CHEST: The visualized lung bases are clear.    HEPATOBILIARY: Unremarkable. No hepatic masses are seen.    PANCREAS: Normal.    SPLEEN: " Normal.    ADRENAL GLANDS: Normal.    KIDNEYS/BLADDER: Unremarkable. No hydronephrosis.    BOWEL: No bowel obstruction. No convincing evidence for colitis or  diverticulitis. Unremarkable appendix.    PELVIC ORGANS: Unremarkable.    LYMPH NODES: No enlarged lymph nodes are identified in the abdomen or  pelvis.    VASCULATURE: Unremarkable.    ADDITIONAL FINDINGS: None.    MUSCULOSKELETAL: Unremarkable.      Impression    IMPRESSION:   No acute abnormality in the abdomen or pelvis. No cause for abdominal  pain is identified.    WILLIAM GREGG MD         SYSTEM ID:  [id number]         CONSULTATION ASSESSMENT AND PLAN:    Rick Tarango is a 31 year old male with medical history of ulcerative pancolitis diagnosed in 2017, history of C. difficile infection 2 years ago, who presents to the hospital with hematochezia and fever.    1.  Hematochezia with reported fever: Unclear if this represents a new infection versus flare of UC.  His hemoglobin is stable.  His white count is only mildly elevated and this is in the setting of outpatient prednisone use.  He has not had any further hematochezia and his hemoglobin is stable at 14.  He has no abdominal pain.  CT scan of his abdomen and pelvis is unremarkable.    -- Awaiting enteric stool panel and C. Difficile  -- Patient is to continue on his prednisone taper.  10 mg oral daily and 5 mg tomorrow for 1 week.    2.  Ulcerative pancolitis: Patient has been flaring for the past 2 months.  He been requiring multiple tapers of prednisone.  He was started on Remicade 5 mg/kg at the end of January.  He received his second infusion dose in February and is due in 2 weeks for his third induction dose.  Clinically there is improvement in his stooling urgency and frequency.  There was one episode of hematochezia with fever which was of concern.    -- Plan as above, await stool studies  -- Continue oral prednisone taper, no IV steroids at this time.  -- Follow up with his primary IBD  MD at [facility].    I discussed the patient plan with Dr. Ryan, GI staff physician. Thank you for asking us to participate in the care of this patient.    Approximately 40 min of total time was spent providing patient care, including patient evaluation, reviewing documentation/ test results, and .     Anais Tinoco PA-C  [facility] ( [facility])

## 2022-03-01 NOTE — PLAN OF CARE
PRIMARY DIAGNOSIS: Ulcerative Colitis    OUTPATIENT/OBSERVATION GOALS TO BE MET BEFORE DISCHARGE  1. Orthostatic performed: N/A    2. Tolerating PO fluid and/or antibiotics (if applicable): Yes    3. Nausea/Vomiting/Diarrhea symptoms improved: Yes    4. Pain status: Pain free.    5. Return to near baseline physical activity: Yes    Discharge Planner Nurse   Safe discharge environment identified: Yes  Barriers to discharge: Yes       Entered by: Aby Coon 03/01/2022 10:37 AM     Please review provider order for any additional goals.   Nurse to notify provider when observation goals have been met and patient is ready for discharge.        AO x4. VSS, LS clear, BS active. Up independently. Tolerating regular diet. Had formed BM, sent sample to lab.  On enteric isolation. Has LR running. GI to follow.

## 2022-03-01 NOTE — DISCHARGE INSTRUCTIONS
Enteric panel is pending at discharge - will call patient if anything returns abnormal on this once resulted.   c diff negative

## 2022-03-01 NOTE — PROGRESS NOTES
Patient's After Visit Summary was reviewed with patient  Patient verbalized understanding of After Visit Summary, recommended follow up and was given an opportunity to ask questions.   Discharge medications sent home with patient/family: No   Discharged with self    OBSERVATION patient END time: 4745

## 2022-03-01 NOTE — PLAN OF CARE
ROOM # 226    Living Situation (if not independent, order SW consult): Ind with wife  Facility name:  : Stephanie (spouse)    Activity level at baseline: Ind  Activity level on admit: Ind    Who will be transporting you at discharge: Spouse    Patient registered to observation; given Patient Bill of Rights; given the opportunity to ask questions about observation status and their plan of care.  Patient has been oriented to the observation room, bathroom and call light is in place.    Discussed discharge goals and expectations with patient/family.

## 2022-03-04 NOTE — DISCHARGE SUMMARY
[facility]  Discharge Summary  Hospitalist    Date of Admission:  2/28/2022  Date of Discharge:  3/1/2022  Discharging Provider: Venus Richter PA-C  Date of Service (when I saw the patient): 3/1/2022    Discharge Diagnoses   Ulcerative Colitis, in current flare/exacerbation  BRBPR  Fever    History of Present Illness   Rick Tarango is a 31 year old male with a PMH significant for ulcerative colitis, previous C. difficile infection, anemia, anxiety and paroxysmal ventricular tachycardia who presented for evaluation with bright red blood per rectum and fever in the setting of ulcerative colitis flare.      Please see admitting H & P  by Lucia Alvarez PA-C on 2/28/2022 for full details of the encounter.     Hospital Course   Rick Tarango was admitted on 2/28/2022.  The following problems were addressed during his hospitalization:    #Bright red blood per rectum with fever  -Development of bright red blood per rectum on 2/28 with fever up to 100.8.  Otherwise feels well except for lack of appetite  -presentation concerning initially for enteric infection with negative testing and reassuring work up. Concern for new infection vs symptoms related to current known UC flare  -Day of discharge with resolving symptoms, formed stool. No recurrent fever. No n/v. Will discharge to home.    #Ulcerative colitis  -Follows with [facility]  -Current flare has been since July, in process of prednisone taper (today 10 - > 5 mg) Imaging without findings for active colitis  -Receives regular Remicade   -GI consulted. Recommending outpatient follow up with primary IBD service. No change in steroids.   -Continue mesalamine     #History of paroxysmal ventricular tachycardia  -Continue midodrine     #Anxiety  -Continue sertraline        Pending Results   These results will be followed up by hoptalist  Unresulted Labs Ordered in the Past 30 Days of this Admission     Date and Time Order Name Status  "Description    2/28/2022  3:43 PM Blood Culture Arm, Left Preliminary     2/28/2022  2:56 PM Blood Culture Arm, Right Preliminary           Code Status   Full Code       Primary Care Physician   [facility]        /72 (BP Location: Left arm)   Pulse 84   Temp 98.3  F (36.8  C) (Oral)   Resp 16   Ht 1.778 m (5' 10\")   Wt 85.7 kg (189 lb)   SpO2 97%   BMI 27.12 kg/m      Constitutional: Awake, alert, no apparent distress  Respiratory:  Normal work of breathing. Lungs clear to auscultation bilaterally, no crackles or wheezing.  Cardiovascular: Regular rate and rhythm, normal S1 and S2, and no murmur appreciated.   GI: Bowel sounds present. soft, non-distended, non-tender.   Skin/Integument: Warm, dry. no peripheral edema.  Neuro: No focal deficits. Moving all extremities with normal strength. Coordination and sensation grossly intact. Speech clear.   Psych: Appropriate affect.        Discharge Disposition   Discharged to home  Condition at discharge: Stable    Consultations This Hospital Stay   GASTROENTEROLOGY IP CONSULT    Time Spent on this Encounter   IVenus PA-C, personally saw the patient today and spent greater than 30 minutes discharging this patient.    Discharge Orders      Reason for your hospital stay    You were admitted to observation after preventing with fever, blood in stool. Infection testing was negative.     Follow-up and recommended labs and tests     Follow up with his primary IBD MD at [facility]. -- continue on prednisone taper.  10 mg oral daily and 5 mg tomorrow f(3/2) or 1 week unless otherwise directed     Activity    Your activity upon discharge: activity as tolerated     When to contact your care team    Call your primary care doctor if you have any of the following: temperature greater than 101 F, worsening shortness of breath, increased swelling, worsening pain, new or unrelenting diarrhea, or any other concerning symptoms. Call 911 or go to the " emergency room if you need immediate assistance.     Diet    Follow this diet upon discharge: Orders Placed This Encounter      Regular Diet Adult/ regress to soft or low fiber diet if abdominal cramping     Discharge Medications   Discharge Medication List as of 3/1/2022  2:52 PM      CONTINUE these medications which have NOT CHANGED    Details   ferrous sulfate (FEROSUL) 325 (65 Fe) MG tablet Take 325 mg by mouth daily (with breakfast), Historical      fish oil-omega-3 fatty acids (OMEGA-3 FISH OIL) 1000 MG capsule Take 1 capsule by mouth daily, Historical      mesalamine (ASACOL HD) 800 MG EC tablet Take 3 tablets by mouth 2 times daily, Historical      midodrine (PROAMATINE) 5 MG tablet Take 5 mg by mouth daily, Historical      Multiple Vitamin (MULTIVITAMIN ADULT PO) Take 1 tablet by mouth daily, Historical      predniSONE (DELTASONE) 10 MG tablet TAKE 4 TABS ONCE DAILY X1 WK, 3 TABS DAILY X1 WK,2 TABS DAILY X1 WK,THEN DECREASE BY 1/2 TAB EACH WK, Historical      sertraline (ZOLOFT) 100 MG tablet Take 100 mg by mouth daily, Historical      Vitamin D3 (CHOLECALCIFEROL) 25 mcg (1000 units) tablet Take 1 tablet by mouth daily, Historical           Allergies   No Known Allergies  Data   Most Recent 3 CBC's:Recent Labs   Lab Test 03/01/22  0610 02/28/22  1408   WBC 7.9 12.1*   HGB 14.3 16.0   MCV 96 94    205      Most Recent 3 BMP's:  Recent Labs   Lab Test 03/01/22  0610 02/28/22  1408    138   POTASSIUM 3.6 3.7   CHLORIDE 110* 107   CO2 30 27   BUN 10 11   CR 0.90 0.86   ANIONGAP 2* 4   DAVON 8.6 9.2   GLC 97 119*     Most Recent 2 LFT's:  Recent Labs   Lab Test 03/01/22  0610 02/28/22  1408   AST 12 15   ALT 16 20   ALKPHOS 40 52   BILITOTAL 1.4* 1.2     Most Recent INR's and Anticoagulation Dosing History:  Anticoagulation Dose History     Recent Dosing and Labs Latest Ref Rng & Units 2/17/2010    INR 0.86 - 1.14 1.02        Most Recent 3 Troponin's:No lab results found.  Most Recent Cholesterol  Panel:No lab results found.  Most Recent 6 Bacteria Isolates From Any Culture (See EPIC Reports for Culture Details):No lab results found.  Most Recent TSH, T4 and A1c Labs:No lab results found.  Results for orders placed or performed during the hospital encounter of 02/28/22   Abd/pelvis CT,  IV  contrast only TRAUMA / AAA    Narrative    CT ABDOMEN PELVIS WITH CONTRAST 2/28/2022 3:14 PM    CLINICAL HISTORY: Fever. Abdominal pain. History of ulcerative  colitis.    TECHNIQUE: CT scan of the abdomen and pelvis was performed following  injection of IV contrast. Multiplanar reformats were obtained. Dose  reduction techniques were used.  CONTRAST: 95mL Isovue-370  COMPARISON: None.    FINDINGS:   LOWER CHEST: The visualized lung bases are clear.    HEPATOBILIARY: Unremarkable. No hepatic masses are seen.    PANCREAS: Normal.    SPLEEN: Normal.    ADRENAL GLANDS: Normal.    KIDNEYS/BLADDER: Unremarkable. No hydronephrosis.    BOWEL: No bowel obstruction. No convincing evidence for colitis or  diverticulitis. Unremarkable appendix.    PELVIC ORGANS: Unremarkable.    LYMPH NODES: No enlarged lymph nodes are identified in the abdomen or  pelvis.    VASCULATURE: Unremarkable.    ADDITIONAL FINDINGS: None.    MUSCULOSKELETAL: Unremarkable.      Impression    IMPRESSION:   No acute abnormality in the abdomen or pelvis. No cause for abdominal  pain is identified.    WILLIAM GREGG MD         SYSTEM ID:  [id number]       Venus Richter PA-C  [facility]

## 2022-03-05 LAB
BACTERIA BLD CULT: NO GROWTH
BACTERIA BLD CULT: NO GROWTH

## 2022-03-13 ENCOUNTER — HEALTH MAINTENANCE LETTER (OUTPATIENT)
Age: 32
End: 2022-03-13

## 2022-04-26 ENCOUNTER — APPOINTMENT (OUTPATIENT)
Dept: URBAN - METROPOLITAN AREA CLINIC 253 | Age: 32
Setting detail: DERMATOLOGY
End: 2022-04-26

## 2022-04-26 VITALS — WEIGHT: 185 LBS | HEIGHT: 70 IN | RESPIRATION RATE: 14 BRPM

## 2022-04-26 DIAGNOSIS — L82.1 OTHER SEBORRHEIC KERATOSIS: ICD-10-CM

## 2022-04-26 DIAGNOSIS — D18.0 HEMANGIOMA: ICD-10-CM

## 2022-04-26 PROBLEM — D18.01 HEMANGIOMA OF SKIN AND SUBCUTANEOUS TISSUE: Status: ACTIVE | Noted: 2022-04-26

## 2022-04-26 PROCEDURE — OTHER COUNSELING: OTHER

## 2022-04-26 PROCEDURE — OTHER ADDITIONAL NOTES: OTHER

## 2022-04-26 PROCEDURE — OTHER MIPS QUALITY: OTHER

## 2022-04-26 PROCEDURE — 99202 OFFICE O/P NEW SF 15 MIN: CPT

## 2022-04-26 ASSESSMENT — LOCATION SIMPLE DESCRIPTION DERM
LOCATION SIMPLE: RIGHT FOREHEAD
LOCATION SIMPLE: LEFT UPPER BACK

## 2022-04-26 ASSESSMENT — LOCATION DETAILED DESCRIPTION DERM
LOCATION DETAILED: LEFT INFERIOR MEDIAL UPPER BACK
LOCATION DETAILED: RIGHT SUPERIOR FOREHEAD

## 2022-04-26 ASSESSMENT — LOCATION ZONE DERM
LOCATION ZONE: TRUNK
LOCATION ZONE: FACE

## 2022-04-26 NOTE — HPI: EVALUATION OF SKIN LESION(S)
What Type Of Note Output Would You Prefer (Optional)?: Standard Output
Have Your Spot(S) Been Treated In The Past?: has not been treated
Hpi Title: Evaluation of a Skin Lesion
Additional History: Dry patch on his back. He noticed it about 3 months ago. It doesn’t get too irritated, it’s small.

## 2022-04-26 NOTE — PROCEDURE: ADDITIONAL NOTES
Render Risk Assessment In Note?: no
Detail Level: Simple
Additional Notes: Pt declined treatment today. Will return for LN in the future If desired
Additional Notes: A clinical assistant was present for the exam. Care instructions were explained to the patient in detail. Told patient to call with any concerns or questions. The patient verbalized understanding and agreement of the education provided and the treatment plan. Encouraged patient to schedule a follow up appointment right after visit. At the end of the visit, all questions had been answered and the patient was satisfied with the visit.

## 2023-01-14 ENCOUNTER — HEALTH MAINTENANCE LETTER (OUTPATIENT)
Age: 33
End: 2023-01-14

## 2023-12-15 ENCOUNTER — APPOINTMENT (OUTPATIENT)
Dept: URBAN - METROPOLITAN AREA CLINIC 253 | Age: 33
Setting detail: DERMATOLOGY
End: 2023-12-15

## 2023-12-15 VITALS — RESPIRATION RATE: 14 BRPM | HEIGHT: 69 IN | WEIGHT: 195 LBS

## 2023-12-15 DIAGNOSIS — D18.0 HEMANGIOMA: ICD-10-CM

## 2023-12-15 DIAGNOSIS — D22 MELANOCYTIC NEVI: ICD-10-CM

## 2023-12-15 DIAGNOSIS — Z71.89 OTHER SPECIFIED COUNSELING: ICD-10-CM

## 2023-12-15 DIAGNOSIS — L84 CORNS AND CALLOSITIES: ICD-10-CM

## 2023-12-15 DIAGNOSIS — L81.4 OTHER MELANIN HYPERPIGMENTATION: ICD-10-CM

## 2023-12-15 PROBLEM — D22.62 MELANOCYTIC NEVI OF LEFT UPPER LIMB, INCLUDING SHOULDER: Status: ACTIVE | Noted: 2023-12-15

## 2023-12-15 PROBLEM — D22.5 MELANOCYTIC NEVI OF TRUNK: Status: ACTIVE | Noted: 2023-12-15

## 2023-12-15 PROBLEM — D48.5 NEOPLASM OF UNCERTAIN BEHAVIOR OF SKIN: Status: ACTIVE | Noted: 2023-12-15

## 2023-12-15 PROBLEM — D18.01 HEMANGIOMA OF SKIN AND SUBCUTANEOUS TISSUE: Status: ACTIVE | Noted: 2023-12-15

## 2023-12-15 PROCEDURE — OTHER BIOPSY BY SHAVE METHOD: OTHER

## 2023-12-15 PROCEDURE — OTHER COUNSELING: OTHER

## 2023-12-15 PROCEDURE — OTHER MIPS QUALITY: OTHER

## 2023-12-15 PROCEDURE — 99213 OFFICE O/P EST LOW 20 MIN: CPT | Mod: 25

## 2023-12-15 PROCEDURE — 11102 TANGNTL BX SKIN SINGLE LES: CPT

## 2023-12-15 PROCEDURE — OTHER ADDITIONAL NOTES: OTHER

## 2023-12-15 ASSESSMENT — LOCATION SIMPLE DESCRIPTION DERM
LOCATION SIMPLE: LOWER BACK
LOCATION SIMPLE: UPPER BACK
LOCATION SIMPLE: LEFT HAND
LOCATION SIMPLE: RIGHT PLANTAR SURFACE
LOCATION SIMPLE: RIGHT UPPER BACK

## 2023-12-15 ASSESSMENT — LOCATION ZONE DERM
LOCATION ZONE: HAND
LOCATION ZONE: TRUNK
LOCATION ZONE: FEET

## 2023-12-15 ASSESSMENT — LOCATION DETAILED DESCRIPTION DERM
LOCATION DETAILED: RIGHT SUPERIOR UPPER BACK
LOCATION DETAILED: RIGHT PLANTAR FOREFOOT OVERLYING 3RD METATARSAL
LOCATION DETAILED: LEFT ULNAR PALM
LOCATION DETAILED: INFERIOR THORACIC SPINE
LOCATION DETAILED: SUPERIOR LUMBAR SPINE

## 2023-12-15 NOTE — PROCEDURE: BIOPSY BY SHAVE METHOD
Detail Level: Detailed
Depth Of Biopsy: dermis
Was A Bandage Applied: Yes
Size Of Lesion In Cm: 0
Biopsy Type: H and E
Biopsy Method: Dermablade
Anesthesia Type: 1% lidocaine with epinephrine
Anesthesia Volume In Cc: 0.5
Hemostasis: Drysol
Wound Care: Petrolatum
Dressing: bandage
Destruction After The Procedure: No
Type Of Destruction Used: Curettage
Curettage Text: The wound bed was treated with curettage after the biopsy was performed.
Cryotherapy Text: The wound bed was treated with cryotherapy after the biopsy was performed.
Electrodesiccation Text: The wound bed was treated with electrodesiccation after the biopsy was performed.
Electrodesiccation And Curettage Text: The wound bed was treated with electrodesiccation and curettage after the biopsy was performed.
Silver Nitrate Text: The wound bed was treated with silver nitrate after the biopsy was performed.
Lab: -[id number]
Consent: Written consent was obtained and risks were reviewed including but not limited to scarring, infection, bleeding, scabbing, incomplete removal, nerve damage and allergy to anesthesia.
Post-Care Instructions: I reviewed with the patient in detail post-care instructions. Patient is to keep the biopsy site dry overnight, and then apply bacitracin twice daily until healed. Patient may apply hydrogen peroxide soaks to remove any crusting.
Notification Instructions: Patient will be notified of biopsy results. However, patient instructed to call the office if not contacted within 2 weeks.
Billing Type: Third-Party Bill
Information: Selecting Yes will display possible errors in your note based on the variables you have selected. This validation is only offered as a suggestion for you. PLEASE NOTE THAT THE VALIDATION TEXT WILL BE REMOVED WHEN YOU FINALIZE YOUR NOTE. IF YOU WANT TO FAX A PRELIMINARY NOTE YOU WILL NEED TO TOGGLE THIS TO 'NO' IF YOU DO NOT WANT IT IN YOUR FAXED NOTE.

## 2023-12-15 NOTE — PROCEDURE: ADDITIONAL NOTES
Render Risk Assessment In Note?: no
Detail Level: Simple
Additional Notes: If bump on foot grows or changes at all, or becomes more painful, follow up. Patient to monitor. Discussed paring down would ensure dx of corn however patient declined this treatment today\\n \\n\\nA clinical assistant was present for the exam. Care instructions of treated site were explained to the patient in detail. Told patient to call with any concerns or questions. The patient verbalized understanding and agreement of the education provided and the treatment plan. Encouraged patient to schedule a follow up appointment right after visit. At the end of the visit, all questions had been answered and the patient was satisfied with the visit.

## 2024-02-11 ENCOUNTER — HEALTH MAINTENANCE LETTER (OUTPATIENT)
Age: 34
End: 2024-02-11

## 2024-06-19 ENCOUNTER — HOSPITAL ENCOUNTER (EMERGENCY)
Facility: CLINIC | Age: 34
Discharge: HOME | End: 2024-06-19
Attending: STUDENT IN AN ORGANIZED HEALTH CARE EDUCATION/TRAINING PROGRAM | Admitting: STUDENT IN AN ORGANIZED HEALTH CARE EDUCATION/TRAINING PROGRAM
Payer: COMMERCIAL

## 2024-06-19 ENCOUNTER — APPOINTMENT (OUTPATIENT)
Dept: CT IMAGING | Facility: CLINIC | Age: 34
End: 2024-06-19
Attending: STUDENT IN AN ORGANIZED HEALTH CARE EDUCATION/TRAINING PROGRAM
Payer: COMMERCIAL

## 2024-06-19 VITALS
TEMPERATURE: 98.8 F | DIASTOLIC BLOOD PRESSURE: 90 MMHG | HEIGHT: 70 IN | SYSTOLIC BLOOD PRESSURE: 128 MMHG | RESPIRATION RATE: 16 BRPM | OXYGEN SATURATION: 98 % | HEART RATE: 81 BPM | BODY MASS INDEX: 27.87 KG/M2 | WEIGHT: 194.67 LBS

## 2024-06-19 DIAGNOSIS — J36 PERITONSILLAR ABSCESS: ICD-10-CM

## 2024-06-19 LAB
ANION GAP SERPL CALCULATED.3IONS-SCNC: 8 MMOL/L (ref 7–15)
BASOPHILS # BLD AUTO: 0 10E3/UL (ref 0–0.2)
BASOPHILS NFR BLD AUTO: 0 %
BUN SERPL-MCNC: 13 MG/DL (ref 6–20)
CALCIUM SERPL-MCNC: 8.9 MG/DL (ref 8.6–10)
CHLORIDE SERPL-SCNC: 105 MMOL/L (ref 98–107)
CREAT SERPL-MCNC: 0.84 MG/DL (ref 0.67–1.17)
DEPRECATED HCO3 PLAS-SCNC: 24 MMOL/L (ref 22–29)
EGFRCR SERPLBLD CKD-EPI 2021: >90 ML/MIN/1.73M2
EOSINOPHIL # BLD AUTO: 0.1 10E3/UL (ref 0–0.7)
EOSINOPHIL NFR BLD AUTO: 1 %
ERYTHROCYTE [DISTWIDTH] IN BLOOD BY AUTOMATED COUNT: 11.7 % (ref 10–15)
GLUCOSE SERPL-MCNC: 87 MG/DL (ref 70–99)
HCT VFR BLD AUTO: 46.8 % (ref 40–53)
HGB BLD-MCNC: 16 G/DL (ref 13.3–17.7)
IMM GRANULOCYTES # BLD: 0 10E3/UL
IMM GRANULOCYTES NFR BLD: 0 %
LYMPHOCYTES # BLD AUTO: 2.8 10E3/UL (ref 0.8–5.3)
LYMPHOCYTES NFR BLD AUTO: 35 %
MCH RBC QN AUTO: 30.9 PG (ref 26.5–33)
MCHC RBC AUTO-ENTMCNC: 34.2 G/DL (ref 31.5–36.5)
MCV RBC AUTO: 90 FL (ref 78–100)
MONOCYTES # BLD AUTO: 0.8 10E3/UL (ref 0–1.3)
MONOCYTES NFR BLD AUTO: 10 %
NEUTROPHILS # BLD AUTO: 4.2 10E3/UL (ref 1.6–8.3)
NEUTROPHILS NFR BLD AUTO: 54 %
NRBC # BLD AUTO: 0 10E3/UL
NRBC BLD AUTO-RTO: 0 /100
PLATELET # BLD AUTO: 220 10E3/UL (ref 150–450)
POTASSIUM SERPL-SCNC: 4.2 MMOL/L (ref 3.4–5.3)
RBC # BLD AUTO: 5.18 10E6/UL (ref 4.4–5.9)
SODIUM SERPL-SCNC: 137 MMOL/L (ref 135–145)
WBC # BLD AUTO: 7.9 10E3/UL (ref 4–11)

## 2024-06-19 PROCEDURE — 250N000011 HC RX IP 250 OP 636: Mod: JZ | Performed by: STUDENT IN AN ORGANIZED HEALTH CARE EDUCATION/TRAINING PROGRAM

## 2024-06-19 PROCEDURE — 85025 COMPLETE CBC W/AUTO DIFF WBC: CPT | Performed by: STUDENT IN AN ORGANIZED HEALTH CARE EDUCATION/TRAINING PROGRAM

## 2024-06-19 PROCEDURE — 250N000011 HC RX IP 250 OP 636: Performed by: STUDENT IN AN ORGANIZED HEALTH CARE EDUCATION/TRAINING PROGRAM

## 2024-06-19 PROCEDURE — 96374 THER/PROPH/DIAG INJ IV PUSH: CPT | Mod: 59

## 2024-06-19 PROCEDURE — 80048 BASIC METABOLIC PNL TOTAL CA: CPT | Performed by: STUDENT IN AN ORGANIZED HEALTH CARE EDUCATION/TRAINING PROGRAM

## 2024-06-19 PROCEDURE — 250N000009 HC RX 250: Performed by: STUDENT IN AN ORGANIZED HEALTH CARE EDUCATION/TRAINING PROGRAM

## 2024-06-19 PROCEDURE — 36415 COLL VENOUS BLD VENIPUNCTURE: CPT | Performed by: STUDENT IN AN ORGANIZED HEALTH CARE EDUCATION/TRAINING PROGRAM

## 2024-06-19 PROCEDURE — 70491 CT SOFT TISSUE NECK W/DYE: CPT

## 2024-06-19 PROCEDURE — 99285 EMERGENCY DEPT VISIT HI MDM: CPT | Mod: 25

## 2024-06-19 RX ORDER — IOPAMIDOL 755 MG/ML
500 INJECTION, SOLUTION INTRAVASCULAR ONCE
Status: COMPLETED | OUTPATIENT
Start: 2024-06-19 | End: 2024-06-19

## 2024-06-19 RX ORDER — KETOROLAC TROMETHAMINE 15 MG/ML
15 INJECTION, SOLUTION INTRAMUSCULAR; INTRAVENOUS ONCE
Status: COMPLETED | OUTPATIENT
Start: 2024-06-19 | End: 2024-06-19

## 2024-06-19 RX ORDER — LIDOCAINE HYDROCHLORIDE 20 MG/ML
5 SOLUTION OROPHARYNGEAL ONCE
Status: COMPLETED | OUTPATIENT
Start: 2024-06-19 | End: 2024-06-19

## 2024-06-19 RX ORDER — METHYLPREDNISOLONE 4 MG
TABLET, DOSE PACK ORAL
Qty: 21 TABLET | Refills: 0 | Status: SHIPPED | OUTPATIENT
Start: 2024-06-19

## 2024-06-19 RX ADMIN — LIDOCAINE HYDROCHLORIDE 5 ML: 20 SOLUTION ORAL at 16:46

## 2024-06-19 RX ADMIN — KETOROLAC TROMETHAMINE 15 MG: 15 INJECTION, SOLUTION INTRAMUSCULAR; INTRAVENOUS at 16:46

## 2024-06-19 RX ADMIN — IOPAMIDOL 90 ML: 755 INJECTION, SOLUTION INTRAVENOUS at 17:09

## 2024-06-19 RX ADMIN — SODIUM CHLORIDE 100 ML: 9 INJECTION, SOLUTION INTRAVENOUS at 17:09

## 2024-06-19 ASSESSMENT — COLUMBIA-SUICIDE SEVERITY RATING SCALE - C-SSRS
1. IN THE PAST MONTH, HAVE YOU WISHED YOU WERE DEAD OR WISHED YOU COULD GO TO SLEEP AND NOT WAKE UP?: NO
6. HAVE YOU EVER DONE ANYTHING, STARTED TO DO ANYTHING, OR PREPARED TO DO ANYTHING TO END YOUR LIFE?: NO
2. HAVE YOU ACTUALLY HAD ANY THOUGHTS OF KILLING YOURSELF IN THE PAST MONTH?: NO

## 2024-06-19 ASSESSMENT — ACTIVITIES OF DAILY LIVING (ADL)
ADLS_ACUITY_SCORE: 35
ADLS_ACUITY_SCORE: 35

## 2024-06-19 NOTE — ED PROVIDER NOTES
"  Emergency Department Note      History of Present Illness     Chief Complaint  Pharyngitis    HPI  Rick Tarango is a 33 year old male with history of ulcerative colitis who presents from urgent care for evaluation of pharyngitis. The patient reports that he ws seen in clinic two days and for right sided tonsillar pain and was started on antibiotics. States that he has been taking the antibiotics as prescribed but has increasing tonsillar pain and dysphagia. Endorses low grade fever yesterday. Adds that he has new skin breakouts on his neck and lumps in his left axilla. He denies nausea and vomiting. He denies known drug or environmental allergies.     Independent Historian  None    Review of External Notes  I reviewed UC note from today when the patient presented for worsening right sided tonsillar pain and was sent to the emergency department for PTA rule out.   I reviewed UC note from 6/17/24 when the patient was seen for pharyngitis and diagnosed with exudative tonsillitis with a ten day prescription for Ceftin. Negative strep swab at that time.   I reviewed strep culture from 6/17/24 which show usual deng.   Past Medical History   Medical History and Problem List  Ulcerative colitis  Atrial fibrillation  Anxiety  Paroxysmal SVT    Medications  Midodrine  Sertraline  Mesalamine infusion    Surgical History   Atrial fibrillation ablation    Physical Exam   Patient Vitals for the past 24 hrs:   BP Temp Temp src Pulse Resp SpO2 Height Weight   06/19/24 1830 (!) 128/90 -- -- 81 16 98 % -- --   06/19/24 1524 127/88 98.8  F (37.1  C) Oral 88 17 99 % 1.778 m (5' 10\") 88.3 kg (194 lb 10.7 oz)     Physical Exam  General: Awake, alert, in no acute distress   HEENT: Atraumatic   EOM normal   External ears normal   Trachea midline  Bilateral Tonsillar erythema, nonfluctuant swelling of the right peritonsillar pillar.  Normal phonation, uvula midline, tolerating secretions  Neck: Supple, normal ROM  CV: Regular " rate, regular rhythm   MSK: No gross deformities  NEURO: Alert, no focal deficits  Skin: Warm, dry and intact    Diagnostics   Lab Results   Labs Ordered and Resulted from Time of ED Arrival to Time of ED Departure   BASIC METABOLIC PANEL - Normal       Result Value    Sodium 137      Potassium 4.2      Chloride 105      Carbon Dioxide (CO2) 24      Anion Gap 8      Urea Nitrogen 13.0      Creatinine 0.84      GFR Estimate >90      Calcium 8.9      Glucose 87     CBC WITH PLATELETS AND DIFFERENTIAL    WBC Count 7.9      RBC Count 5.18      Hemoglobin 16.0      Hematocrit 46.8      MCV 90      MCH 30.9      MCHC 34.2      RDW 11.7      Platelet Count 220      % Neutrophils 54      % Lymphocytes 35      % Monocytes 10      % Eosinophils 1      % Basophils 0      % Immature Granulocytes 0      NRBCs per 100 WBC 0      Absolute Neutrophils 4.2      Absolute Lymphocytes 2.8      Absolute Monocytes 0.8      Absolute Eosinophils 0.1      Absolute Basophils 0.0      Absolute Immature Granulocytes 0.0      Absolute NRBCs 0.0         Imaging  Soft tissue neck CT w contrast   Final Result   IMPRESSION:    1.  Moderate enlargement of bilateral palatine tonsils, greater on the right.   2.  Small 5 x 5 mm right peritonsillar abscess.        Independent Interpretation  None  ED Course    Medications Administered  Medications   lidocaine (viscous) (XYLOCAINE) 2 % solution 5 mL (5 mLs Mouth/Throat $Given 6/19/24 1646)   ketorolac (TORADOL) injection 15 mg (15 mg Intravenous $Given 6/19/24 1646)   iopamidol (ISOVUE-370) solution 500 mL (90 mLs Intravenous $Given 6/19/24 1709)     Procedures  Procedures     Discussion of Management  None    Social Determinants of Health adding to complexity of care  None    ED Course  ED Course as of 06/20/24 0120   Wed Jun 19, 2024   1630 I obtained history and examined the patient as noted above.    1822 I rechecked and updated the patient.      Medical Decision Making / Diagnosis   CMS Diagnoses:  None    MIPS     None    [facility]  Rick Derek Tarango is a 33 year old male who presents with sore throat and right peritonsillar fullness.  Had strep testing and culture that was negative 2 days ago.  Treated with Ceftin with worsening.  CT shows 5 x 5 mm peritonsillar abscess--too small for ED drainage today. Not septic or toxic appearing. Will discontinue Ceftin and start Augmentin, steroid Dosepak, strict return precautions for any worsening. All questions answered. All results reviewed. Patient voices understanding and agreement of this plan.          Disposition  The patient was discharged.     ICD-10 Codes:    ICD-10-CM    1. Peritonsillar abscess  J36            Discharge Medications  Discharge Medication List as of 6/19/2024  6:23 PM        START taking these medications    Details   amoxicillin-clavulanate (AUGMENTIN) 875-125 MG tablet Take 1 tablet by mouth 2 times daily for 10 days, Disp-20 tablet, R-0, E-Prescribe      methylPREDNISolone (MEDROL DOSEPAK) 4 MG tablet therapy pack Follow Package Directions, Disp-21 tablet, R-0, E-Prescribe           Scribe Disclosure:  I, Maria T Weaver, am serving as a scribe at 4:21 PM on 6/19/2024 to document services personally performed by Jeniffer Banda DO based on my observations and the provider's statements to me.        Jeniffer Banda DO  06/20/24 0120

## 2024-06-19 NOTE — ED TRIAGE NOTES
Pt comes from  in [place] [employer]. Pt states he has had R sided tonsillitis since Monday and was given antibiotics then. Pt states the sore throat & swelling has been getting worse. Pt sent here to rule out abscess. Pt states he is still able to handle secretions. Pt had negative strep test at .

## 2024-06-20 ENCOUNTER — TELEPHONE (OUTPATIENT)
Dept: OTOLARYNGOLOGY | Facility: CLINIC | Age: 34
End: 2024-06-20
Payer: COMMERCIAL

## 2024-06-20 NOTE — TELEPHONE ENCOUNTER
[facility] Call [facility]    Phone Message    May a detailed message be left on voicemail: yes     Reason for Call: Other: Pt has been diagnosed with a peritonsillar abscess, no referral. Pt would like to be seen at [facility]     Action Taken: Other: [facility] ENT    Travel Screening: Not Applicable     Date of Service:

## 2024-06-20 NOTE — TELEPHONE ENCOUNTER
[facility] Call [facility]    Phone Message    May a detailed message be left on voicemail: yes     Reason for Call: Other: Pt going to calling preferred clinics. Thanks.     Action Taken: Other: ENT    Travel Screening: Not Applicable     Date of Service:

## 2024-06-20 NOTE — TELEPHONE ENCOUNTER
Left Voicemail (1st Attempt) for the patient to call back and schedule the following:    Appointment type: New ENT   Provider: Community Provider  Return date: Next Available  Specialty phone number: [phone]  Additional appointment(s) needed:   Additonal Notes:  peritonsillar abscess, no referral.

## 2024-06-22 ENCOUNTER — HOSPITAL ENCOUNTER (EMERGENCY)
Facility: CLINIC | Age: 34
Discharge: HOME | End: 2024-06-22
Attending: EMERGENCY MEDICINE | Admitting: EMERGENCY MEDICINE
Payer: COMMERCIAL

## 2024-06-22 VITALS
BODY MASS INDEX: 27.96 KG/M2 | SYSTOLIC BLOOD PRESSURE: 143 MMHG | WEIGHT: 195.33 LBS | OXYGEN SATURATION: 99 % | RESPIRATION RATE: 16 BRPM | TEMPERATURE: 98.2 F | HEIGHT: 70 IN | HEART RATE: 105 BPM | DIASTOLIC BLOOD PRESSURE: 81 MMHG

## 2024-06-22 DIAGNOSIS — R21 RASH AND NONSPECIFIC SKIN ERUPTION: ICD-10-CM

## 2024-06-22 DIAGNOSIS — R07.0 THROAT PAIN: ICD-10-CM

## 2024-06-22 PROCEDURE — 250N000013 HC RX MED GY IP 250 OP 250 PS 637: Performed by: EMERGENCY MEDICINE

## 2024-06-22 PROCEDURE — 99284 EMERGENCY DEPT VISIT MOD MDM: CPT

## 2024-06-22 RX ORDER — DOXYCYCLINE 100 MG/1
100 CAPSULE ORAL 2 TIMES DAILY
Qty: 14 CAPSULE | Refills: 0 | Status: SHIPPED | OUTPATIENT
Start: 2024-06-22 | End: 2024-06-29

## 2024-06-22 RX ORDER — ACETAMINOPHEN 325 MG/1
975 TABLET ORAL ONCE
Status: COMPLETED | OUTPATIENT
Start: 2024-06-22 | End: 2024-06-22

## 2024-06-22 RX ORDER — DIPHENHYDRAMINE HYDROCHLORIDE AND LIDOCAINE HYDROCHLORIDE AND ALUMINUM HYDROXIDE AND MAGNESIUM HYDRO
5-10 KIT EVERY 6 HOURS PRN
Qty: 237 ML | Refills: 0 | Status: SHIPPED | OUTPATIENT
Start: 2024-06-22

## 2024-06-22 RX ORDER — DIPHENHYDRAMINE HYDROCHLORIDE AND LIDOCAINE HYDROCHLORIDE AND ALUMINUM HYDROXIDE AND MAGNESIUM HYDRO
10 KIT ONCE
Status: COMPLETED | OUTPATIENT
Start: 2024-06-22 | End: 2024-06-22

## 2024-06-22 RX ADMIN — ACETAMINOPHEN 975 MG: 325 TABLET, FILM COATED ORAL at 11:24

## 2024-06-22 RX ADMIN — DIPHENHYDRAMINE HYDROCHLORIDE AND LIDOCAINE HYDROCHLORIDE AND ALUMINUM HYDROXIDE AND MAGNESIUM HYDRO 10 ML: KIT at 10:57

## 2024-06-22 ASSESSMENT — COLUMBIA-SUICIDE SEVERITY RATING SCALE - C-SSRS
1. IN THE PAST MONTH, HAVE YOU WISHED YOU WERE DEAD OR WISHED YOU COULD GO TO SLEEP AND NOT WAKE UP?: NO
2. HAVE YOU ACTUALLY HAD ANY THOUGHTS OF KILLING YOURSELF IN THE PAST MONTH?: NO
6. HAVE YOU EVER DONE ANYTHING, STARTED TO DO ANYTHING, OR PREPARED TO DO ANYTHING TO END YOUR LIFE?: NO

## 2024-06-22 ASSESSMENT — ACTIVITIES OF DAILY LIVING (ADL)
ADLS_ACUITY_SCORE: 33
ADLS_ACUITY_SCORE: 35

## 2024-06-22 NOTE — DISCHARGE INSTRUCTIONS
Please return to the emergency department if your symptoms increase, you experience an increase in swelling, or any problems breathing or swallowing.    Continue your steroids.  Continue Augmentin.  Please start taking doxycycline as well.  You can use lidocaine mouthwash to help with throat pain.  You can use Tylenol and ibuprofen for pain.  Please drink plenty of fluids.  Please keep your appointment with the ENT on Tuesday.

## 2024-06-22 NOTE — ED PROVIDER NOTES
"    History     Chief Complaint:  Rash       HPI   Rick Tarango is a 33 year old male who presents for evaluation of a rash. Patient reports that he went in to Urgent Care 5 days ago on 6/17 with right sided tonsil pain and was diagnosed with tonsillitis for which he started antibiotics. Then 3 days ago on 6/19 his pain worsened so he came in to the ED to find that he did have a minor abscess in the back of his throat for which he then began a course of steroids and antibiotics. He states that his swelling has since gone down but his pain has worsened.  Patient reports he developed rash on the left side of his neck which he initially believed to be a spider bite but has been worsening since Monday. Patient endorses a low grade fever of 100 degrees since the pain began on 6/17 and has scheduled an appointment with an ENT for 6/25 but notes that his has been unable to sleep due to the pain and so presents to the ED.      Independent Historian:    None     Review of External Notes:  Reviewed ED visit from 6/19/2024, patient was diagnosed with a small right peritonsillar abscess too small to drain in the ED.  He was placed on Augmentin and a Solu-Medrol Dosepak.    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  No obvious abscess of the right tonsil.  There is a small white patch on the right tonsil.  There is a small amount of redness in the posterior oropharynx bilaterally.  No swelling.  Respiratory:  Lungs clear to auscultation bilaterally, no crackles/rubs/wheezes.  Good air movement  CV: Normal rate and rhythm, no murmurs  GI:  Abdomen soft and non-distended.  No tenderness, guarding or rebound  Skin: Warm, dry.  Multiple lesions on the neck and the chest, there are pustules and lesions with scabs.  No de leon crusting identified.  No target lesions.  No vesicles.  Musculoskeletal: No peripheral edema or calf tenderness  Neuro: Alert and oriented to person/place/time  Psychiatric: Normal affect      Emergency Department Course     Laboratory:  Labs Ordered and Resulted from Time of ED Arrival to Time of ED Departure - No data to display       Emergency Department Course & Assessments:    Interventions:  Medications   magic mouthwash suspension (diphenhydramine, lidocaine, aluminum-magnesium & simethicone) (10 mLs Swish & Swallow $Given 24 1057)   acetaminophen (TYLENOL) tablet 975 mg (975 mg Oral $Given 24 1124)        Assessments:  1011 I obtained patient history and performed a physical exam.   1116 I reassessed patient.  Patient is feeling better after the Magic mouthwash.    Consultations/Discussion of Management or Tests:  Spoke with Dr. Galdamez with ENT.  Follow-up on Tuesday with ENT is acceptable.  Keep patient as comfortable as possible till then.  As long as there is no other signs of Candida infection or worsening airway involvement.       Social Determinants of Health affecting care:  None     Disposition:  The patient was discharged.    Impression & Plan    CMS Diagnoses: None       Medical Decision Makin-year-old male presents emergency department with a complaint of  throat pain and rash.  Patient reports that this all started on Monday.  He was diagnosed with tonsillitis, and prescribed Ceftin.  He did present to the emergency department 2 days later with worsening pain and swelling.  He was diagnosed with a right peritonsillar abscess and switch to Augmentin as well as steroids.  Patient reports since then the swelling has gotten better, but his pain still remains and he feels like it is a little bit worse in his throat.  He states that it is on both sides of his throat.  He also reports that the rash on his neck and chest has not gone away with the antibiotics.  On exam patient does have pustular rash on his neck and chest, this appears to be a staph infection.  No signs of vesicles, I have low suspicion for shingles.  No signs of abscess or cellulitis.  On evaluation of his throat, there is some redness in the posterior oropharynx.  There is a white patch on his right tonsillar that he reports has been there for the duration of his illness.  No swelling.  No obvious abscess.  Patient is moving his head and neck around with ease, I have low suspicion for deep space infection such as retropharyngeal abscess.  Patient is given Magic mouthwash and his throat pain does improve.  He is able to eat and drink in the room.  He is given Tylenol as well.  I did speak with Dr. Galdamez with the ENT.  She agrees with follow-up on Tuesday unless his symptoms worsen.  She wanted to make sure there was no Candida infection in his throat from the steroids, which I do not appreciate on exam.  Otherwise keep the patient comfortable with Magic mouthwash, antibiotics, and steroids.  Patient is informed of this plan, he agrees.  I am going to start the patient on doxycycline to cover for staph infections.  I will also prescribe Magic mouthwash for home.  Patient is discharged home.    Diagnosis:    ICD-10-CM    1. Throat pain  R07.0       2. Rash and nonspecific skin eruption  R21             Discharge Medications:  Discharge Medication List as of 6/22/2024 11:37 AM        START taking these medications    Details   doxycycline hyclate (VIBRAMYCIN) 100 MG capsule Take 1 capsule (100 mg) by mouth 2 times daily for 7 days, Disp-14 capsule, R-0, E-Prescribe      magic mouthwash suspension, diphenhydrAMINE, lidocaine, aluminum-magnesium & simethicone, (FIRST-MOUTHWASH BLM) compounding kit Swish and swallow 5-10 mLs in mouth every 6 hours as needed for mouth sores, Disp-237 mL, R-0, E-Prescribe                Scribe Disclosure:  I, Cesia Leija, am serving as a scribe at 10:15 AM on 6/22/2024 to document services personally performed by Ruthie Cano MD based on my observations and the provider's statements to me.  6/22/2024   Ruthie Cano MD Richardson, Elizabeth, MD  06/23/24 0811       Ruthie Cano MD  06/23/24 0813

## 2024-06-22 NOTE — ED TRIAGE NOTES
"Pt arrives ambulatory by self for throat pain that is worsening since peritonsil abscess diagnosis in ED on Wednesday. Swelling better but pain worse and reports \"white dots\" in throat now. Can't sleep or eat well. Reports rash that began Wednesday on neck that has spread to chest and back since Wednesday. Was put on antibiotic and steroid - still taking doses.          "

## 2025-01-05 ENCOUNTER — HEALTH MAINTENANCE LETTER (OUTPATIENT)
Age: 35
End: 2025-01-05

## 2025-07-08 ENCOUNTER — APPOINTMENT (OUTPATIENT)
Dept: URBAN - METROPOLITAN AREA CLINIC 253 | Age: 35
Setting detail: DERMATOLOGY
End: 2025-07-10

## 2025-07-08 VITALS — WEIGHT: 315 LBS | HEIGHT: 70 IN

## 2025-07-08 DIAGNOSIS — D22 MELANOCYTIC NEVI: ICD-10-CM

## 2025-07-08 DIAGNOSIS — L82.1 OTHER SEBORRHEIC KERATOSIS: ICD-10-CM

## 2025-07-08 DIAGNOSIS — B65.3 CERCARIAL DERMATITIS: ICD-10-CM

## 2025-07-08 DIAGNOSIS — Z71.89 OTHER SPECIFIED COUNSELING: ICD-10-CM

## 2025-07-08 DIAGNOSIS — D18.0 HEMANGIOMA: ICD-10-CM

## 2025-07-08 DIAGNOSIS — L81.4 OTHER MELANIN HYPERPIGMENTATION: ICD-10-CM

## 2025-07-08 PROBLEM — D18.01 HEMANGIOMA OF SKIN AND SUBCUTANEOUS TISSUE: Status: ACTIVE | Noted: 2025-07-08

## 2025-07-08 PROBLEM — D22.5 MELANOCYTIC NEVI OF TRUNK: Status: ACTIVE | Noted: 2025-07-08

## 2025-07-08 PROCEDURE — OTHER MIPS QUALITY: OTHER

## 2025-07-08 PROCEDURE — 99213 OFFICE O/P EST LOW 20 MIN: CPT

## 2025-07-08 PROCEDURE — OTHER COUNSELING: OTHER

## 2025-07-08 ASSESSMENT — LOCATION SIMPLE DESCRIPTION DERM
LOCATION SIMPLE: UPPER BACK
LOCATION SIMPLE: LOWER BACK

## 2025-07-08 ASSESSMENT — LOCATION DETAILED DESCRIPTION DERM
LOCATION DETAILED: INFERIOR THORACIC SPINE
LOCATION DETAILED: SUPERIOR LUMBAR SPINE

## 2025-07-08 ASSESSMENT — LOCATION ZONE DERM: LOCATION ZONE: TRUNK
